# Patient Record
Sex: FEMALE | Race: WHITE | NOT HISPANIC OR LATINO | Employment: OTHER | URBAN - METROPOLITAN AREA
[De-identification: names, ages, dates, MRNs, and addresses within clinical notes are randomized per-mention and may not be internally consistent; named-entity substitution may affect disease eponyms.]

---

## 2019-07-02 ENCOUNTER — OFFICE VISIT (OUTPATIENT)
Dept: PRIMARY CARE CLINIC | Facility: CLINIC | Age: 59
End: 2019-07-02
Payer: COMMERCIAL

## 2019-07-02 ENCOUNTER — CLINICAL SUPPORT (OUTPATIENT)
Dept: PRIMARY CARE CLINIC | Facility: CLINIC | Age: 59
End: 2019-07-02
Payer: COMMERCIAL

## 2019-07-02 VITALS
BODY MASS INDEX: 24.64 KG/M2 | HEIGHT: 67 IN | SYSTOLIC BLOOD PRESSURE: 111 MMHG | OXYGEN SATURATION: 98 % | HEART RATE: 64 BPM | RESPIRATION RATE: 18 BRPM | TEMPERATURE: 98 F | WEIGHT: 157 LBS | DIASTOLIC BLOOD PRESSURE: 69 MMHG

## 2019-07-02 DIAGNOSIS — Z12.39 BREAST CANCER SCREENING: ICD-10-CM

## 2019-07-02 DIAGNOSIS — I10 ESSENTIAL HYPERTENSION, BENIGN: Primary | ICD-10-CM

## 2019-07-02 DIAGNOSIS — I10 ESSENTIAL HYPERTENSION, BENIGN: ICD-10-CM

## 2019-07-02 DIAGNOSIS — Z11.59 NEED FOR HEPATITIS C SCREENING TEST: ICD-10-CM

## 2019-07-02 DIAGNOSIS — Z13.6 ENCOUNTER FOR SCREENING FOR CARDIOVASCULAR DISORDERS: ICD-10-CM

## 2019-07-02 DIAGNOSIS — Z90.49 HISTORY OF RIGHT HEMICOLECTOMY: ICD-10-CM

## 2019-07-02 DIAGNOSIS — Z12.11 COLON CANCER SCREENING: ICD-10-CM

## 2019-07-02 DIAGNOSIS — Z12.4 CERVICAL CANCER SCREENING: ICD-10-CM

## 2019-07-02 DIAGNOSIS — Z98.84 HISTORY OF BARIATRIC SURGERY: ICD-10-CM

## 2019-07-02 LAB
25(OH)D3+25(OH)D2 SERPL-MCNC: 34 NG/ML (ref 30–96)
ALBUMIN SERPL BCP-MCNC: 4.5 G/DL (ref 3.5–5.2)
ALP SERPL-CCNC: 62 U/L (ref 38–126)
ALT SERPL W/O P-5'-P-CCNC: 15 U/L (ref 14–54)
ANION GAP SERPL CALC-SCNC: 8 MMOL/L (ref 8–16)
AST SERPL-CCNC: 21 U/L (ref 15–41)
BASOPHILS # BLD AUTO: 0 K/UL (ref 0–0.2)
BASOPHILS NFR BLD: 0.6 % (ref 0–1.9)
BILIRUB SERPL-MCNC: 1 MG/DL (ref 0.3–1.2)
BUN SERPL-MCNC: 18 MG/DL (ref 6–20)
CALCIUM SERPL-MCNC: 9.4 MG/DL (ref 8.6–10)
CHLORIDE SERPL-SCNC: 103 MMOL/L (ref 101–111)
CHOLEST SERPL-MCNC: 328 MG/DL (ref 80–200)
CHOLEST/HDLC SERPL: 7.5 {RATIO} (ref 2–5)
CO2 SERPL-SCNC: 29 MMOL/L (ref 23–29)
CREAT SERPL-MCNC: 0.8 MG/DL (ref 0.5–1.4)
DIFFERENTIAL METHOD: ABNORMAL
EOSINOPHIL # BLD AUTO: 0.1 K/UL (ref 0–0.5)
EOSINOPHIL NFR BLD: 2 % (ref 0–8)
ERYTHROCYTE [DISTWIDTH] IN BLOOD BY AUTOMATED COUNT: 13.8 % (ref 11.5–14.5)
EST. GFR  (AFRICAN AMERICAN): >60 ML/MIN/1.73 M^2
EST. GFR  (NON AFRICAN AMERICAN): >60 ML/MIN/1.73 M^2
FERRITIN SERPL-MCNC: 66 NG/ML (ref 20–300)
FOLATE SERPL-MCNC: 12.1 NG/ML
GLUCOSE SERPL-MCNC: 87 MG/DL (ref 74–118)
HCT VFR BLD AUTO: 40.4 % (ref 37–48.5)
HDLC SERPL-MCNC: 44 MG/DL (ref 40–75)
HDLC SERPL: 13.4 % (ref 20–50)
HGB BLD-MCNC: 13.3 G/DL (ref 12–16)
IRON SERPL-MCNC: 83 UG/DL (ref 30–160)
LDLC SERPL CALC-MCNC: 211 MG/DL
LYMPHOCYTES # BLD AUTO: 1.8 K/UL (ref 1–4.8)
LYMPHOCYTES NFR BLD: 33.7 % (ref 18–48)
MCH RBC QN AUTO: 29.7 PG (ref 27–31)
MCHC RBC AUTO-ENTMCNC: 33 G/DL (ref 32–36)
MCV RBC AUTO: 90 FL (ref 82–98)
MONOCYTES # BLD AUTO: 0.4 K/UL (ref 0.3–1)
MONOCYTES NFR BLD: 8.3 % (ref 4–15)
NEUTROPHILS # BLD AUTO: 2.9 K/UL (ref 1.8–7.7)
NEUTROPHILS NFR BLD: 55.4 % (ref 38–73)
NONHDLC SERPL-MCNC: 284 MG/DL
PLATELET # BLD AUTO: 223 K/UL (ref 150–350)
PMV BLD AUTO: 8.9 FL (ref 9.2–12.9)
POTASSIUM SERPL-SCNC: 3.6 MMOL/L (ref 3.5–5.1)
PROT SERPL-MCNC: 8 G/DL (ref 6–8.4)
RBC # BLD AUTO: 4.48 M/UL (ref 4–5.4)
SATURATED IRON: 21 % (ref 20–50)
SODIUM SERPL-SCNC: 140 MMOL/L (ref 136–145)
TOTAL IRON BINDING CAPACITY: 391 UG/DL (ref 250–450)
TRANSFERRIN SERPL-MCNC: 264 MG/DL (ref 200–375)
TRIGL SERPL-MCNC: 364 MG/DL (ref 30–150)
TSH SERPL DL<=0.005 MIU/L-ACNC: 1.79 UIU/ML (ref 0.45–5.33)
VIT B12 SERPL-MCNC: 212 PG/ML (ref 180–914)
WBC # BLD AUTO: 5.3 K/UL (ref 3.9–12.7)

## 2019-07-02 PROCEDURE — 36415 PR COLLECTION VENOUS BLOOD,VENIPUNCTURE: ICD-10-PCS | Mod: S$GLB,,, | Performed by: FAMILY MEDICINE

## 2019-07-02 PROCEDURE — 99203 PR OFFICE/OUTPT VISIT, NEW, LEVL III, 30-44 MIN: ICD-10-PCS | Mod: S$GLB,,, | Performed by: FAMILY MEDICINE

## 2019-07-02 PROCEDURE — 36415 COLL VENOUS BLD VENIPUNCTURE: CPT | Mod: S$GLB,,, | Performed by: FAMILY MEDICINE

## 2019-07-02 PROCEDURE — 99999 PR PBB SHADOW E&M-EST. PATIENT-LVL IV: ICD-10-PCS | Mod: PBBFAC,,, | Performed by: FAMILY MEDICINE

## 2019-07-02 PROCEDURE — 82306 VITAMIN D 25 HYDROXY: CPT

## 2019-07-02 PROCEDURE — 93010 ELECTROCARDIOGRAM REPORT: CPT | Mod: S$GLB,,, | Performed by: INTERNAL MEDICINE

## 2019-07-02 PROCEDURE — 99999 PR PBB SHADOW E&M-EST. PATIENT-LVL IV: CPT | Mod: PBBFAC,,, | Performed by: FAMILY MEDICINE

## 2019-07-02 PROCEDURE — 80053 COMPREHEN METABOLIC PANEL: CPT

## 2019-07-02 PROCEDURE — 82728 ASSAY OF FERRITIN: CPT

## 2019-07-02 PROCEDURE — 99999 PR PBB SHADOW E&M-EST. PATIENT-LVL I: ICD-10-PCS | Mod: PBBFAC,,,

## 2019-07-02 PROCEDURE — 80061 LIPID PANEL: CPT

## 2019-07-02 PROCEDURE — 82746 ASSAY OF FOLIC ACID SERUM: CPT

## 2019-07-02 PROCEDURE — 3008F PR BODY MASS INDEX (BMI) DOCUMENTED: ICD-10-PCS | Mod: CPTII,S$GLB,, | Performed by: FAMILY MEDICINE

## 2019-07-02 PROCEDURE — 93010 EKG 12-LEAD: ICD-10-PCS | Mod: S$GLB,,, | Performed by: INTERNAL MEDICINE

## 2019-07-02 PROCEDURE — 84443 ASSAY THYROID STIM HORMONE: CPT

## 2019-07-02 PROCEDURE — 3008F BODY MASS INDEX DOCD: CPT | Mod: CPTII,S$GLB,, | Performed by: FAMILY MEDICINE

## 2019-07-02 PROCEDURE — 83540 ASSAY OF IRON: CPT

## 2019-07-02 PROCEDURE — 86803 HEPATITIS C AB TEST: CPT

## 2019-07-02 PROCEDURE — 93005 ELECTROCARDIOGRAM TRACING: CPT | Mod: S$GLB,,, | Performed by: FAMILY MEDICINE

## 2019-07-02 PROCEDURE — 99203 OFFICE O/P NEW LOW 30 MIN: CPT | Mod: S$GLB,,, | Performed by: FAMILY MEDICINE

## 2019-07-02 PROCEDURE — 85025 COMPLETE CBC W/AUTO DIFF WBC: CPT

## 2019-07-02 PROCEDURE — 99999 PR PBB SHADOW E&M-EST. PATIENT-LVL I: CPT | Mod: PBBFAC,,,

## 2019-07-02 PROCEDURE — 82607 VITAMIN B-12: CPT

## 2019-07-02 PROCEDURE — 93005 EKG 12-LEAD: ICD-10-PCS | Mod: S$GLB,,, | Performed by: FAMILY MEDICINE

## 2019-07-02 RX ORDER — METOPROLOL SUCCINATE 25 MG/1
25 TABLET, EXTENDED RELEASE ORAL DAILY
Refills: 0 | COMMUNITY
Start: 2019-06-20 | End: 2019-07-02

## 2019-07-02 NOTE — PROGRESS NOTES
"Subjective:       Patient ID: Alexia Zaragoza is a 58 y.o. female.    Chief Complaint: Establish Care    Here for routine checkup, establish care. Wants to see if she really needs to stay on BP meds. Started on low-dose metoprolol ~1 year ago by NP, has never taken any other BP meds. Denies CP or SoB. Has brief palpitations maybe once or twice a month. She is s/p gastric sleeve ~6 years ago    Review of Systems   Constitutional: Positive for fatigue. Negative for chills and fever.   HENT: Negative for congestion.    Eyes: Negative for visual disturbance.   Respiratory: Negative for cough and shortness of breath.    Cardiovascular: Negative for chest pain.   Gastrointestinal: Negative for abdominal pain, blood in stool, nausea and vomiting.   Genitourinary: Negative for difficulty urinating.   Musculoskeletal: Negative for arthralgias.   Skin: Negative for rash.   Allergic/Immunologic: Negative for immunocompromised state.   Neurological: Negative for dizziness.   Hematological: Does not bruise/bleed easily.   Psychiatric/Behavioral: Negative for sleep disturbance.       Objective:      Vitals:    07/02/19 1002   BP: 111/69   BP Location: Left arm   Patient Position: Sitting   BP Method: Medium (Automatic)   Pulse: 64   Resp: 18   Temp: 98.1 °F (36.7 °C)   TempSrc: Oral   SpO2: 98%   Weight: 71.2 kg (157 lb)   Height: 5' 7" (1.702 m)     Physical Exam   Constitutional: She is oriented to person, place, and time. She appears well-developed and well-nourished.   HENT:   Head: Normocephalic and atraumatic.   Eyes: Pupils are equal, round, and reactive to light. EOM are normal.   Neck: Neck supple. No JVD present. Carotid bruit is not present.   Cardiovascular: Normal rate, regular rhythm and normal heart sounds.   Pulses:       Radial pulses are 2+ on the right side, and 2+ on the left side.   Pulmonary/Chest: Effort normal and breath sounds normal.   Abdominal: Soft. Bowel sounds are normal. She exhibits no " distension. There is no tenderness.   Musculoskeletal: She exhibits no edema.   Neurological: She is alert and oriented to person, place, and time.   Skin: Skin is warm and dry.   Psychiatric: She has a normal mood and affect. Her behavior is normal.   Nursing note and vitals reviewed.      Assessment:       1. Essential hypertension, benign    2. Breast cancer screening    3. Need for hepatitis C screening test    4. Colon cancer screening    5. Encounter for screening for cardiovascular disorders    6. Cervical cancer screening    7. History of right hemicolectomy    8. History of bariatric surgery        Plan:       Essential hypertension, benign  -     CBC auto differential; Future; Expected date: 07/02/2019  -     Comprehensive metabolic panel; Future; Expected date: 07/02/2019  -     TSH; Future; Expected date: 07/02/2019  -     EKG 12-lead  Sinus bradycardia on EKG.  Decrease metoprolol to half a tablet daily for a week, then stop.  Monitor blood pressure closely.  Return in 2 weeks for blood pressure check  Breast cancer screening  -     Mammo Digital Screening Bilat without CA; Future; Expected date: 07/02/2019    Need for hepatitis C screening test  -     Hepatitis C antibody; Future; Expected date: 07/02/2019    Colon cancer screening  -     Ambulatory referral to Colorectal Surgery    Encounter for screening for cardiovascular disorders  -     Lipid panel; Future; Expected date: 07/02/2019    Cervical cancer screening  Comments:  says she will f/u with her GYN on the [facility]    History of right hemicolectomy  -     Ambulatory referral to Colorectal Surgery    History of bariatric surgery  -     Ferritin; Future; Expected date: 07/02/2019  -     Iron and TIBC; Future; Expected date: 07/02/2019  -     Vitamin B12; Future; Expected date: 07/02/2019  -     Folate; Future; Expected date: 07/02/2019  -     Vitamin D; Future; Expected date: 07/02/2019      Medication List with Changes/Refills   Discontinued  Medications    METOPROLOL SUCCINATE (TOPROL-XL) 25 MG 24 HR TABLET    Take 25 mg by mouth once daily.

## 2019-07-03 LAB — HCV AB SERPL QL IA: NEGATIVE

## 2019-07-10 DIAGNOSIS — E78.2 MIXED HYPERLIPIDEMIA: ICD-10-CM

## 2019-07-10 RX ORDER — ROSUVASTATIN CALCIUM 10 MG/1
10 TABLET, COATED ORAL DAILY
Qty: 90 TABLET | Refills: 1 | Status: SHIPPED | OUTPATIENT
Start: 2019-07-10 | End: 2019-12-23 | Stop reason: SDUPTHER

## 2019-07-12 DIAGNOSIS — Z12.11 COLON CANCER SCREENING: ICD-10-CM

## 2019-07-16 ENCOUNTER — CLINICAL SUPPORT (OUTPATIENT)
Dept: PRIMARY CARE CLINIC | Facility: CLINIC | Age: 59
End: 2019-07-16
Payer: COMMERCIAL

## 2019-07-16 VITALS — DIASTOLIC BLOOD PRESSURE: 74 MMHG | OXYGEN SATURATION: 96 % | SYSTOLIC BLOOD PRESSURE: 110 MMHG | HEART RATE: 76 BPM

## 2019-07-16 PROCEDURE — 99999 PR PBB SHADOW E&M-EST. PATIENT-LVL II: CPT | Mod: PBBFAC,,,

## 2019-07-16 PROCEDURE — 99999 PR PBB SHADOW E&M-EST. PATIENT-LVL II: ICD-10-PCS | Mod: PBBFAC,,,

## 2019-07-16 NOTE — PROGRESS NOTES
Verified pt ID using name and . No BP medication. Obtained bp, p, and sp02. Notified physician of results. Instructed pt to repeat labs in 3 months and f/u with MD after labs. Start Crestor 10mg. Pt verbalized understanding

## 2019-07-26 ENCOUNTER — TELEPHONE (OUTPATIENT)
Dept: SURGERY | Facility: CLINIC | Age: 59
End: 2019-07-26

## 2019-07-26 NOTE — TELEPHONE ENCOUNTER
----- Message from Sandi Thornton sent at 7/26/2019  3:08 PM CDT -----  Contact: Patient  Type:  Patient Returning Call    Who Called:  Patient  Who Left Message for Patient:  Jose  Does the patient know what this is regarding?:  Appointment on 8/6  Best Call Back Number:  [phone] (home)    Additional Information:  Patient states that she will arrive at 2:30

## 2019-08-06 ENCOUNTER — OFFICE VISIT (OUTPATIENT)
Dept: SURGERY | Facility: CLINIC | Age: 59
End: 2019-08-06
Payer: COMMERCIAL

## 2019-08-06 VITALS
BODY MASS INDEX: 25.15 KG/M2 | HEART RATE: 76 BPM | RESPIRATION RATE: 16 BRPM | SYSTOLIC BLOOD PRESSURE: 140 MMHG | TEMPERATURE: 98 F | DIASTOLIC BLOOD PRESSURE: 72 MMHG | WEIGHT: 160.25 LBS | HEIGHT: 67 IN

## 2019-08-06 DIAGNOSIS — Z86.010 HISTORY OF COLON POLYPS: Primary | ICD-10-CM

## 2019-08-06 DIAGNOSIS — Z12.11 SCREEN FOR COLON CANCER: ICD-10-CM

## 2019-08-06 PROCEDURE — 99999 PR PBB SHADOW E&M-EST. PATIENT-LVL III: CPT | Mod: PBBFAC,,, | Performed by: SURGERY

## 2019-08-06 PROCEDURE — 3008F PR BODY MASS INDEX (BMI) DOCUMENTED: ICD-10-PCS | Mod: CPTII,S$GLB,, | Performed by: SURGERY

## 2019-08-06 PROCEDURE — 99203 OFFICE O/P NEW LOW 30 MIN: CPT | Mod: S$GLB,,, | Performed by: SURGERY

## 2019-08-06 PROCEDURE — 3008F BODY MASS INDEX DOCD: CPT | Mod: CPTII,S$GLB,, | Performed by: SURGERY

## 2019-08-06 PROCEDURE — 99999 PR PBB SHADOW E&M-EST. PATIENT-LVL III: ICD-10-PCS | Mod: PBBFAC,,, | Performed by: SURGERY

## 2019-08-06 PROCEDURE — 99203 PR OFFICE/OUTPT VISIT, NEW, LEVL III, 30-44 MIN: ICD-10-PCS | Mod: S$GLB,,, | Performed by: SURGERY

## 2019-08-06 NOTE — LETTER
August 6, 2019      Hugo Franco MD  [address]  [address]  [facility] [address]           [facility] General Surgery  [address]  [facility] [address]  Phone: [phone]          Patient: Alexia Zaragoza   MR Number: [id number]   YOB: 1960   Date of Visit: 8/6/2019       Dear Dr. Hugo Franco:    Thank you for referring Alexia Zaragoza to me for evaluation. Attached you will find relevant portions of my assessment and plan of care.    If you have questions, please do not hesitate to call me. I look forward to following Alexia Zaragoza along with you.    Sincerely,    Dylon Fong MD    Enclosure  CC:  No Recipients    If you would like to receive this communication electronically, please contact [email or web address] or [phone] to request more information on [employer] Link access.    For providers and/or their staff who would like to refer a patient to [facility], please contact us through our one-stop-shop provider referral line, [facility], at [phone].    If you feel you have received this communication in error or would no longer like to receive these types of communications, please e-mail [email or web address]

## 2019-08-06 NOTE — PATIENT INSTRUCTIONS
Colonoscopy is scheduled for 09/19/19 the arrival time will be given to you by the preop nurse.  The preop nurse will call you from [phone]  Fasting after midnight  An Adult to drive you home        THE PREOP NURSE WILL CALL, SOMETIMES AS LATE AS 4 PM IN THE AFTERNOON THE DAY BEFORE SURGERY.        Special Instruction:Bowel Prep is included with this letter, call Gianfranco at [phone] if you have any questions or concerns or if you need to reschedule your procedure.  The procedure will be at [facility]    Here is instruction for your colon cleanse the day before your procedure.    Medication to purchase at your pharmacy no need for Prescription:  2 bottles of Magnesium Citrate 10 or 12 ounce bottle will do.  Dulcolax tablets you will need 4 tablets in total.    On the day before the procedure you are on clear liquids all day, no solid food.   You can take your morning medications if you are allowed by your Doctor.  Clear liquid means any nonalcoholic  liquids including Jello and popcicles, juice with no pulp, soft drinks, tea, coffee no creamer, water Gatorade, chicken and/or beef broth.    You can start taking your first laxative starting as early as 8am but try not to start later than 12:00 noon.  First dose will be two Dulcolax tabs followed by 8 ounces clear liquid.  Take 2 more Dulcolax 2 hours after the first dose followed by 8 ounces of clear liquids.  Remember that the laxatives work best when you drink plenty of fluids.    Drink your first bottle of Magnesium Citrate at 5:00pm followed by 5-8ounce glasses of liquid over a 3 hour period.  At 9:00 pm take your 2nd bottle magnesium citrate followed by 3 more 8ounce glasses of clear liquid.  After Midnight nothing else to eat or drink.    Contact the office if you have questions.

## 2019-08-07 RX ORDER — SODIUM CHLORIDE, SODIUM LACTATE, POTASSIUM CHLORIDE, CALCIUM CHLORIDE 600; 310; 30; 20 MG/100ML; MG/100ML; MG/100ML; MG/100ML
INJECTION, SOLUTION INTRAVENOUS CONTINUOUS
Status: CANCELLED | OUTPATIENT
Start: 2019-08-07

## 2019-08-07 RX ORDER — SODIUM CHLORIDE 0.9 % (FLUSH) 0.9 %
10 SYRINGE (ML) INJECTION
Status: CANCELLED | OUTPATIENT
Start: 2019-08-07

## 2019-08-07 NOTE — PROGRESS NOTES
Subjective:       Patient ID: Alexia Zaragoza is a 58 y.o. female.    Chief Complaint: No chief complaint on file.    HPI  Pleasant 57 yo F referred to me in consultation from Dr Franco for evaluation of colon cancer. Pt notes that she had a R hemicolectomy about 9 years ago for a large colon polyp that was too large to be removed endoscopically.  PT notes that she has been doing well since. She was recommended to have colonoscopy at 5 years postop but has not had one.  She denies pain or discomfort.  Denies n/v.  No fever/chills. No changes in bowel habits.  She is otherwise healthy. Her PShx is significant for gastric sleeve and incsional hernia repair x3.   Review of Systems   Constitutional: Negative for activity change, appetite change, fever and unexpected weight change.   HENT: Negative for congestion and facial swelling.    Respiratory: Negative for chest tightness, shortness of breath and wheezing.    Cardiovascular: Negative for chest pain.   Gastrointestinal: Negative for abdominal distention, abdominal pain, anal bleeding, blood in stool, constipation, diarrhea, nausea, rectal pain and vomiting.   Genitourinary: Negative for difficulty urinating, dysuria and frequency.   Skin: Negative for color change and wound.   Neurological: Negative for dizziness.   Hematological: Negative for adenopathy. Does not bruise/bleed easily.   Psychiatric/Behavioral: Negative for agitation and decreased concentration.       Objective:      Physical Exam   Constitutional: She is oriented to person, place, and time. She appears well-developed and well-nourished.   HENT:   Head: Normocephalic and atraumatic.   Eyes: Pupils are equal, round, and reactive to light.   Neck: Normal range of motion. Neck supple. No tracheal deviation present. No thyromegaly present.   Cardiovascular: Normal rate, regular rhythm and normal heart sounds.   No murmur heard.  Pulmonary/Chest: Effort normal and breath sounds normal. She exhibits no  tenderness.   Abdominal: Soft. Bowel sounds are normal. She exhibits no distension, no abdominal bruit, no pulsatile midline mass and no mass. There is no hepatosplenomegaly. There is no tenderness. There is no rigidity, no rebound, no guarding, no tenderness at McBurney's point and negative Yeboah's sign. No hernia. Hernia confirmed negative in the ventral area.   Genitourinary: Rectum normal.   Musculoskeletal: Normal range of motion.   Neurological: She is alert and oriented to person, place, and time.   Skin: Skin is warm. No rash noted. No erythema.   Psychiatric: She has a normal mood and affect.   Vitals reviewed.      Assessment:     History of colon polyp  No diagnosis found.    Plan:       D?w pt. I have recommended cscope at a time that is convenient to pt.  Rissk and benefits were d/w pt.  Informed consent.  Proceed with colonoscopy at a time to be scheduled

## 2019-09-04 ENCOUNTER — TELEPHONE (OUTPATIENT)
Dept: SURGERY | Facility: CLINIC | Age: 59
End: 2019-09-04

## 2019-09-04 ENCOUNTER — TELEPHONE (OUTPATIENT)
Dept: CARDIOLOGY | Facility: CLINIC | Age: 59
End: 2019-09-04

## 2019-09-04 NOTE — TELEPHONE ENCOUNTER
Spoke with patient about her coming colon screening. Stated she does not have the ability to take off from work for 9/19/19 and would like to reschedule for some time in the middle of December.

## 2019-09-04 NOTE — TELEPHONE ENCOUNTER
----- Message from Mark Mendes sent at 9/4/2019  1:05 PM CDT -----  Contact: Patient  Type: Needs Medical Advice    Who Called:  Patient  Best Call Back Number: [phone]  Additional Information: Patient would like to reschedule upcoming colonoscopy to December. Please call to advise. Thanks!

## 2019-09-05 DIAGNOSIS — Z12.11 SCREENING FOR COLON CANCER: Primary | ICD-10-CM

## 2019-09-05 RX ORDER — SODIUM CHLORIDE 0.9 % (FLUSH) 0.9 %
3 SYRINGE (ML) INJECTION
Status: CANCELLED | OUTPATIENT
Start: 2019-09-05

## 2019-09-05 NOTE — TELEPHONE ENCOUNTER
----- Message from Rita Vasquez sent at 9/5/2019  3:17 PM CDT -----  Contact: Patient  Type: Needs Medical Advice    Who Called:  patient  Best Call Back Number: [phone] (home)   Additional Information: patient would like to cancel the appt for 9/19 with Dylon Fong and reschedule the colonoscopy and asking for a call back said she has left 3 messages an no one has called her back.

## 2019-09-05 NOTE — TELEPHONE ENCOUNTER
Returned patient's call. Patient requested the Colonoscopy scheduled for 09/19/2019 be canceled and rescheduled for 12/12/2019. Patient acknowledges receiving and understanding the previously sent Colonoscopy Prep instructions. Patient did not have any questions about the Colonoscopy procedure No further issues were discussed.. Anne in surgery notified of patient's request to cancel and reschedule Colonoscopy by staff messaging.

## 2019-12-02 ENCOUNTER — PATIENT OUTREACH (OUTPATIENT)
Dept: ADMINISTRATIVE | Facility: HOSPITAL | Age: 59
End: 2019-12-02

## 2019-12-03 ENCOUNTER — TELEPHONE (OUTPATIENT)
Dept: PRIMARY CARE CLINIC | Facility: CLINIC | Age: 59
End: 2019-12-03

## 2019-12-03 NOTE — PROGRESS NOTES
Immunizations reviewed. Legacy reviewed. Care Everywhere reviewed. Attempted to contact patient to discuss overdue HM. LMOM for return call. Portal message sent to patient with appointment task attached. Pre-visit chart review completed.

## 2019-12-03 NOTE — TELEPHONE ENCOUNTER
----- Message from Obdulia Powers sent at 12/3/2019 10:00 AM CST -----  Contact: pATIENT  Returned Call    Who left the message: Jordan Brar LPN    When did the practice call: 12/02/2019 7:17PM    Please advise.    Thank You

## 2019-12-05 ENCOUNTER — PATIENT OUTREACH (OUTPATIENT)
Dept: ADMINISTRATIVE | Facility: HOSPITAL | Age: 59
End: 2019-12-05

## 2019-12-06 ENCOUNTER — TELEPHONE (OUTPATIENT)
Dept: SURGERY | Facility: CLINIC | Age: 59
End: 2019-12-06

## 2019-12-06 NOTE — TELEPHONE ENCOUNTER
Placed a follow up call to patient related to upcoming Colonoscopy procedure.  . Colonoscopy Prep instructions were summarized with the patient. Patient acknowledges understanding Prep instructions. No further issues were discussed.

## 2019-12-16 ENCOUNTER — CLINICAL SUPPORT (OUTPATIENT)
Dept: PRIMARY CARE CLINIC | Facility: CLINIC | Age: 59
End: 2019-12-16
Payer: COMMERCIAL

## 2019-12-16 ENCOUNTER — TELEPHONE (OUTPATIENT)
Dept: SURGERY | Facility: CLINIC | Age: 59
End: 2019-12-16

## 2019-12-16 DIAGNOSIS — E78.2 MIXED HYPERLIPIDEMIA: ICD-10-CM

## 2019-12-16 LAB
ALBUMIN SERPL BCP-MCNC: 4.3 G/DL (ref 3.5–5.2)
ALP SERPL-CCNC: 60 U/L (ref 38–126)
ALT SERPL W/O P-5'-P-CCNC: 21 U/L (ref 14–54)
ANION GAP SERPL CALC-SCNC: 8 MMOL/L (ref 8–16)
AST SERPL-CCNC: 33 U/L (ref 15–41)
BILIRUB SERPL-MCNC: 1 MG/DL (ref 0.3–1.2)
BUN SERPL-MCNC: 20 MG/DL (ref 6–20)
CALCIUM SERPL-MCNC: 9.5 MG/DL (ref 8.6–10)
CHLORIDE SERPL-SCNC: 106 MMOL/L (ref 101–111)
CHOLEST SERPL-MCNC: 301 MG/DL (ref 80–200)
CHOLEST/HDLC SERPL: 7.3 {RATIO} (ref 2–5)
CO2 SERPL-SCNC: 26 MMOL/L (ref 23–29)
CREAT SERPL-MCNC: 0.8 MG/DL (ref 0.5–1.4)
EST. GFR  (AFRICAN AMERICAN): >60 ML/MIN/1.73 M^2
EST. GFR  (NON AFRICAN AMERICAN): >60 ML/MIN/1.73 M^2
GLUCOSE SERPL-MCNC: 85 MG/DL (ref 74–118)
HDLC SERPL-MCNC: 41 MG/DL (ref 40–75)
HDLC SERPL: 13.6 % (ref 20–50)
LDLC SERPL CALC-MCNC: 213 MG/DL
NONHDLC SERPL-MCNC: 260 MG/DL
POTASSIUM SERPL-SCNC: 4 MMOL/L (ref 3.5–5.1)
PROT SERPL-MCNC: 7.3 G/DL (ref 6–8.4)
SODIUM SERPL-SCNC: 140 MMOL/L (ref 136–145)
TRIGL SERPL-MCNC: 235 MG/DL (ref 30–150)

## 2019-12-16 PROCEDURE — 80061 LIPID PANEL: CPT

## 2019-12-16 PROCEDURE — 36415 PR COLLECTION VENOUS BLOOD,VENIPUNCTURE: ICD-10-PCS | Mod: S$GLB,,, | Performed by: FAMILY MEDICINE

## 2019-12-16 PROCEDURE — 36415 COLL VENOUS BLD VENIPUNCTURE: CPT | Mod: S$GLB,,, | Performed by: FAMILY MEDICINE

## 2019-12-16 PROCEDURE — 80053 COMPREHEN METABOLIC PANEL: CPT

## 2019-12-16 NOTE — TELEPHONE ENCOUNTER
----- Message from Zuleyma Chase sent at 12/16/2019  2:03 PM CST -----  Contact: self  [phone]  She asks that you email the instructions for the colonoscopy.  Please call her.  Thank you!

## 2019-12-16 NOTE — TELEPHONE ENCOUNTER
Spoke with patient about her needing the prep instructions for her colon screening. Asked if the patient could pass by the clinic tomorrow to obtain a copy of the instructions. She stated she could come to the clinic after 3p. No further issues discussed.

## 2019-12-17 ENCOUNTER — TELEPHONE (OUTPATIENT)
Dept: SURGERY | Facility: CLINIC | Age: 59
End: 2019-12-17

## 2019-12-17 NOTE — TELEPHONE ENCOUNTER
Called patient at all available numbers to review Colonoscopy Prep instructions for upcoming scheduled Colonoscopy and to give the patient an opportunity to ask any questions about upcoming Colonoscopy, left message.

## 2019-12-23 ENCOUNTER — OFFICE VISIT (OUTPATIENT)
Dept: PRIMARY CARE CLINIC | Facility: CLINIC | Age: 59
End: 2019-12-23
Payer: COMMERCIAL

## 2019-12-23 VITALS
HEIGHT: 67 IN | TEMPERATURE: 98 F | BODY MASS INDEX: 24.64 KG/M2 | RESPIRATION RATE: 18 BRPM | SYSTOLIC BLOOD PRESSURE: 132 MMHG | DIASTOLIC BLOOD PRESSURE: 88 MMHG | OXYGEN SATURATION: 96 % | WEIGHT: 157 LBS | HEART RATE: 63 BPM

## 2019-12-23 DIAGNOSIS — Z91.148 NONCOMPLIANCE W/MEDICATION TREATMENT DUE TO INTERMIT USE OF MEDICATION: ICD-10-CM

## 2019-12-23 DIAGNOSIS — E78.2 MIXED HYPERLIPIDEMIA: Primary | ICD-10-CM

## 2019-12-23 DIAGNOSIS — M54.41 ACUTE RIGHT-SIDED LOW BACK PAIN WITH RIGHT-SIDED SCIATICA: ICD-10-CM

## 2019-12-23 DIAGNOSIS — J06.9 UPPER RESPIRATORY TRACT INFECTION, UNSPECIFIED TYPE: ICD-10-CM

## 2019-12-23 PROCEDURE — 99214 PR OFFICE/OUTPT VISIT, EST, LEVL IV, 30-39 MIN: ICD-10-PCS | Mod: S$GLB,,, | Performed by: FAMILY MEDICINE

## 2019-12-23 PROCEDURE — 99999 PR PBB SHADOW E&M-EST. PATIENT-LVL III: ICD-10-PCS | Mod: PBBFAC,,, | Performed by: FAMILY MEDICINE

## 2019-12-23 PROCEDURE — 3008F PR BODY MASS INDEX (BMI) DOCUMENTED: ICD-10-PCS | Mod: CPTII,S$GLB,, | Performed by: FAMILY MEDICINE

## 2019-12-23 PROCEDURE — 99214 OFFICE O/P EST MOD 30 MIN: CPT | Mod: S$GLB,,, | Performed by: FAMILY MEDICINE

## 2019-12-23 PROCEDURE — 3008F BODY MASS INDEX DOCD: CPT | Mod: CPTII,S$GLB,, | Performed by: FAMILY MEDICINE

## 2019-12-23 PROCEDURE — 99999 PR PBB SHADOW E&M-EST. PATIENT-LVL III: CPT | Mod: PBBFAC,,, | Performed by: FAMILY MEDICINE

## 2019-12-23 RX ORDER — IBUPROFEN 800 MG/1
800 TABLET ORAL 3 TIMES DAILY PRN
Qty: 30 TABLET | Refills: 2 | Status: SHIPPED | OUTPATIENT
Start: 2019-12-23 | End: 2020-06-01 | Stop reason: HOSPADM

## 2019-12-23 RX ORDER — ROSUVASTATIN CALCIUM 10 MG/1
10 TABLET, COATED ORAL DAILY
Qty: 90 TABLET | Refills: 1 | Status: SHIPPED | OUTPATIENT
Start: 2019-12-23 | End: 2020-09-01

## 2019-12-23 RX ORDER — METHYLPREDNISOLONE 4 MG/1
TABLET ORAL
Qty: 1 PACKAGE | Refills: 0 | Status: SHIPPED | OUTPATIENT
Start: 2019-12-23 | End: 2020-05-06 | Stop reason: CLARIF

## 2019-12-23 NOTE — PROGRESS NOTES
"Subjective:       Patient ID: Alexia Zaragoza is a 59 y.o. female.    Chief Complaint: Cough (x1 week ); Hyperlipidemia (here to follow up on lab results ); and Back Pain (says she was wrapping presents and hurt her back )    While bent over wrapping presents last night, stood up quickly and 'threw out' her back, having pain in right lower back, radiates into right buttock and posterior thigh. Trouble sleeping due to pain last night, can't stand up straight  Has had nonproductive cough and congestion x 1 week, worse at night and early AM. No fever or chills, no wheezing or SoB  HLD - cholesterol still very elevated despite taking Crestor for the past few months, though says she has missed doses frequently    Review of Systems   Constitutional: Negative for fever.   HENT: Positive for congestion.    Respiratory: Positive for cough. Negative for shortness of breath.    Cardiovascular: Negative for chest pain.   Gastrointestinal: Negative for vomiting.   Musculoskeletal: Positive for back pain.   Skin: Negative for rash.   Allergic/Immunologic: Negative for immunocompromised state.   Neurological: Negative for weakness.   Hematological: Does not bruise/bleed easily.   Psychiatric/Behavioral: Negative for agitation.       Objective:      Vitals:    12/23/19 1623   BP: 132/88   BP Location: Left arm   Patient Position: Sitting   BP Method: Medium (Manual)   Pulse: 63   Resp: 18   Temp: 98.1 °F (36.7 °C)   TempSrc: Oral   SpO2: 96%   Weight: 71.2 kg (157 lb)   Height: 5' 7" (1.702 m)     Physical Exam   Constitutional: She is oriented to person, place, and time. She appears well-developed and well-nourished.   HENT:   Head: Normocephalic and atraumatic.   Right Ear: Tympanic membrane normal.   Left Ear: Tympanic membrane normal.   Mouth/Throat: Oropharynx is clear and moist.   Cardiovascular: Normal rate, regular rhythm and normal heart sounds.   Pulmonary/Chest: Effort normal and breath sounds normal.   Musculoskeletal: " She exhibits no edema.        Lumbar back: She exhibits decreased range of motion, tenderness and spasm.        Back:    Positive straight leg raise on right   Neurological: She is alert and oriented to person, place, and time. She has normal strength.   Reflex Scores:       Patellar reflexes are 2+ on the right side and 2+ on the left side.  Skin: Skin is warm and dry.   Psychiatric: She has a normal mood and affect. Her behavior is normal.   Nursing note and vitals reviewed.      Lab Results   Component Value Date    WBC 5.30 07/02/2019    HGB 13.3 07/02/2019    HCT 40.4 07/02/2019     07/02/2019    CHOL 301 (H) 12/16/2019    TRIG 235 (H) 12/16/2019    HDL 41 12/16/2019    ALT 21 12/16/2019    AST 33 12/16/2019     12/16/2019    K 4.0 12/16/2019     12/16/2019    CREATININE 0.8 12/16/2019    BUN 20 12/16/2019    CO2 26 12/16/2019    TSH 1.79 07/02/2019      Assessment:       1. Mixed hyperlipidemia    2. Upper respiratory tract infection, unspecified type    3. Acute right-sided low back pain with right-sided sciatica    4. Noncompliance w/medication treatment due to intermit use of medication        Plan:       Mixed hyperlipidemia  -     rosuvastatin (CRESTOR) 10 MG tablet; Take 1 tablet (10 mg total) by mouth once daily.  Dispense: 90 tablet; Refill: 1  -     Comprehensive metabolic panel; Future; Expected date: 03/23/2020  -     Lipid panel; Future; Expected date: 03/23/2020    Upper respiratory tract infection, unspecified type  Likely viral, treat symptomatically  Acute right-sided low back pain with right-sided sciatica  -     methylPREDNISolone (MEDROL DOSEPACK) 4 mg tablet; use as directed  Dispense: 1 Package; Refill: 0  -     ibuprofen (ADVIL,MOTRIN) 800 MG tablet; Take 1 tablet (800 mg total) by mouth 3 (three) times daily as needed for Pain.  Dispense: 30 tablet; Refill: 2    Noncompliance w/medication treatment due to intermit use of medication  Filled 90 day supply Crestor 7/10,  didn't refill until 12/19. Stressed importance of compliance with meds    Medication List with Changes/Refills   New Medications    IBUPROFEN (ADVIL,MOTRIN) 800 MG TABLET    Take 1 tablet (800 mg total) by mouth 3 (three) times daily as needed for Pain.    METHYLPREDNISOLONE (MEDROL DOSEPACK) 4 MG TABLET    use as directed   Changed and/or Refilled Medications    Modified Medication Previous Medication    ROSUVASTATIN (CRESTOR) 10 MG TABLET rosuvastatin (CRESTOR) 10 MG tablet       Take 1 tablet (10 mg total) by mouth once daily.    Take 1 tablet (10 mg total) by mouth once daily.

## 2020-01-17 ENCOUNTER — TELEPHONE (OUTPATIENT)
Dept: SURGERY | Facility: CLINIC | Age: 60
End: 2020-01-17

## 2020-01-17 NOTE — TELEPHONE ENCOUNTER
Called and attempted to reviewed pathology with pt.      Colon, transverse polyp (biopsy):   Tubular adenoma   Multiple deeper levels examined      Pt did not answer  Recommend repeat cscope in 5 years    WIll have office reach out to pt

## 2020-03-08 ENCOUNTER — PATIENT OUTREACH (OUTPATIENT)
Dept: ADMINISTRATIVE | Facility: HOSPITAL | Age: 60
End: 2020-03-08

## 2020-03-08 NOTE — PROGRESS NOTES
Immunizations reviewed. Legacy reviewed. Care Everywhere reviewed. [facility] reviewed. Pre-visit chart review completed.

## 2020-03-10 LAB
ALBUMIN SERPL-MCNC: 4.6 G/DL (ref 3.6–5.1)
ALBUMIN/GLOB SERPL: 1.9 (CALC) (ref 1–2.5)
ALP SERPL-CCNC: 60 U/L (ref 37–153)
ALT SERPL-CCNC: 19 U/L (ref 6–29)
AST SERPL-CCNC: 23 U/L (ref 10–35)
BILIRUB SERPL-MCNC: 0.7 MG/DL (ref 0.2–1.2)
BUN SERPL-MCNC: 17 MG/DL (ref 7–25)
BUN/CREAT SERPL: NORMAL (CALC) (ref 6–22)
CALCIUM SERPL-MCNC: 9.6 MG/DL (ref 8.6–10.4)
CHLORIDE SERPL-SCNC: 104 MMOL/L (ref 98–110)
CHOLEST SERPL-MCNC: 199 MG/DL
CHOLEST/HDLC SERPL: 3.9 (CALC)
CO2 SERPL-SCNC: 30 MMOL/L (ref 20–32)
CREAT SERPL-MCNC: 0.77 MG/DL (ref 0.5–1.05)
GFRSERPLBLD MDRD-ARVRAT: 86 ML/MIN/1.73M2
GLOBULIN SER CALC-MCNC: 2.4 G/DL (CALC) (ref 1.9–3.7)
GLUCOSE SERPL-MCNC: 98 MG/DL (ref 65–99)
HDLC SERPL-MCNC: 51 MG/DL
LDLC SERPL CALC-MCNC: 120 MG/DL (CALC)
NONHDLC SERPL-MCNC: 148 MG/DL (CALC)
POTASSIUM SERPL-SCNC: 4.1 MMOL/L (ref 3.5–5.3)
PROT SERPL-MCNC: 7 G/DL (ref 6.1–8.1)
SODIUM SERPL-SCNC: 142 MMOL/L (ref 135–146)
TRIGL SERPL-MCNC: 164 MG/DL

## 2020-04-16 ENCOUNTER — TELEPHONE (OUTPATIENT)
Dept: PRIMARY CARE CLINIC | Facility: CLINIC | Age: 60
End: 2020-04-16

## 2020-04-16 NOTE — TELEPHONE ENCOUNTER
Called patient in regards to her recently canceled appointment.  She says she does not to rescheduled at this time but asked for her lab results. Results note from lizbteh given to patient and patient verbalized understanding

## 2020-05-07 ENCOUNTER — HOSPITAL ENCOUNTER (OUTPATIENT)
Facility: HOSPITAL | Age: 60
Discharge: HOME | End: 2020-05-10
Attending: FAMILY MEDICINE | Admitting: FAMILY MEDICINE
Payer: COMMERCIAL

## 2020-05-07 ENCOUNTER — NURSE TRIAGE (OUTPATIENT)
Dept: ADMINISTRATIVE | Facility: CLINIC | Age: 60
End: 2020-05-07

## 2020-05-07 ENCOUNTER — TELEPHONE (OUTPATIENT)
Dept: PRIMARY CARE CLINIC | Facility: CLINIC | Age: 60
End: 2020-05-07

## 2020-05-07 DIAGNOSIS — K80.50 CHOLEDOCHOLITHIASIS: ICD-10-CM

## 2020-05-07 DIAGNOSIS — R79.89 ELEVATED LFTS: ICD-10-CM

## 2020-05-07 DIAGNOSIS — K80.42 CHOLEDOCHOLITHIASIS WITH ACUTE CHOLECYSTITIS: Primary | ICD-10-CM

## 2020-05-07 DIAGNOSIS — E78.2 MIXED HYPERLIPIDEMIA: ICD-10-CM

## 2020-05-07 PROBLEM — Z90.49 HISTORY OF RIGHT HEMICOLECTOMY: Status: RESOLVED | Noted: 2019-07-02 | Resolved: 2020-05-07

## 2020-05-07 LAB
ESTIMATED AVG GLUCOSE: 120 MG/DL (ref 68–131)
HBA1C MFR BLD HPLC: 5.8 % (ref 4–5.6)
MAGNESIUM SERPL-MCNC: 2.2 MG/DL (ref 1.6–2.6)
PHOSPHATE SERPL-MCNC: 3.3 MG/DL (ref 2.7–4.5)
TSH SERPL DL<=0.005 MIU/L-ACNC: 0.76 UIU/ML (ref 0.4–4)

## 2020-05-07 PROCEDURE — G0379 DIRECT REFER HOSPITAL OBSERV: HCPCS

## 2020-05-07 PROCEDURE — 96374 THER/PROPH/DIAG INJ IV PUSH: CPT

## 2020-05-07 PROCEDURE — 83735 ASSAY OF MAGNESIUM: CPT

## 2020-05-07 PROCEDURE — 84443 ASSAY THYROID STIM HORMONE: CPT

## 2020-05-07 PROCEDURE — 36415 COLL VENOUS BLD VENIPUNCTURE: CPT

## 2020-05-07 PROCEDURE — 63600175 PHARM REV CODE 636 W HCPCS: Performed by: STUDENT IN AN ORGANIZED HEALTH CARE EDUCATION/TRAINING PROGRAM

## 2020-05-07 PROCEDURE — 83036 HEMOGLOBIN GLYCOSYLATED A1C: CPT

## 2020-05-07 PROCEDURE — G0378 HOSPITAL OBSERVATION PER HR: HCPCS

## 2020-05-07 PROCEDURE — 84100 ASSAY OF PHOSPHORUS: CPT

## 2020-05-07 RX ORDER — IBUPROFEN 200 MG
16 TABLET ORAL
Status: DISCONTINUED | OUTPATIENT
Start: 2020-05-07 | End: 2020-05-10 | Stop reason: HOSPADM

## 2020-05-07 RX ORDER — PANTOPRAZOLE SODIUM 40 MG/1
40 TABLET, DELAYED RELEASE ORAL DAILY
Status: DISCONTINUED | OUTPATIENT
Start: 2020-05-09 | End: 2020-05-10 | Stop reason: HOSPADM

## 2020-05-07 RX ORDER — GLUCAGON 1 MG
1 KIT INJECTION
Status: DISCONTINUED | OUTPATIENT
Start: 2020-05-07 | End: 2020-05-10 | Stop reason: HOSPADM

## 2020-05-07 RX ORDER — KETOROLAC TROMETHAMINE 30 MG/ML
15 INJECTION, SOLUTION INTRAMUSCULAR; INTRAVENOUS EVERY 6 HOURS PRN
Status: DISCONTINUED | OUTPATIENT
Start: 2020-05-07 | End: 2020-05-09

## 2020-05-07 RX ORDER — TALC
9 POWDER (GRAM) TOPICAL NIGHTLY PRN
Status: DISCONTINUED | OUTPATIENT
Start: 2020-05-07 | End: 2020-05-10 | Stop reason: HOSPADM

## 2020-05-07 RX ORDER — ACETAMINOPHEN 325 MG/1
650 TABLET ORAL EVERY 4 HOURS PRN
Status: DISCONTINUED | OUTPATIENT
Start: 2020-05-07 | End: 2020-05-09

## 2020-05-07 RX ORDER — SODIUM CHLORIDE 0.9 % (FLUSH) 0.9 %
5 SYRINGE (ML) INJECTION
Status: DISCONTINUED | OUTPATIENT
Start: 2020-05-07 | End: 2020-05-10 | Stop reason: HOSPADM

## 2020-05-07 RX ORDER — IBUPROFEN 200 MG
24 TABLET ORAL
Status: DISCONTINUED | OUTPATIENT
Start: 2020-05-07 | End: 2020-05-10 | Stop reason: HOSPADM

## 2020-05-07 RX ORDER — ONDANSETRON 8 MG/1
8 TABLET, ORALLY DISINTEGRATING ORAL EVERY 6 HOURS PRN
Status: DISCONTINUED | OUTPATIENT
Start: 2020-05-07 | End: 2020-05-10 | Stop reason: HOSPADM

## 2020-05-07 RX ADMIN — KETOROLAC TROMETHAMINE 15 MG: 30 INJECTION, SOLUTION INTRAMUSCULAR at 10:05

## 2020-05-07 NOTE — TELEPHONE ENCOUNTER
Kacey instructed patient to go back to the ED. Tried calling patient to see if she wants to call her daughter with an update since I do not have the rights to speak to her daughter but she did not answer.

## 2020-05-07 NOTE — TELEPHONE ENCOUNTER
----- Message from Tommie Saab sent at 5/7/2020 10:27 AM CDT -----  Contact: Patient [phone]  Patient would like to get medical advice.  Symptoms (please be specific): Chest/Back pain      Comments: Patient stating was told to follow up with provider regarding chest and back pain yesterday, today, is having really bad chest and back pain is requesting call back asap.    Also got over to nurse on call    Please call an advise  Thank you

## 2020-05-07 NOTE — TELEPHONE ENCOUNTER
Spoke with pt:went to ED last night. Has not filled meds prescribed to pt. Was told to follow up with PCP today.       + nausea and vomiting. + CP > greater than 5 minutes located to back and comes through to chest and ribs. S/s did start after drinking coffee. Pain rated as severe.           Reason for Disposition   Chest pain lasting longer than 5 minutes and ANY of the following:* Over 50 years old* Over 30 years old and at least one cardiac risk factor (i.e., high blood pressure, diabetes, high cholesterol, obesity, smoker or strong family history of heart disease)* Pain is crushing, pressure-like, or heavy * Took nitroglycerin and chest pain was not relieved* History of heart disease (i.e., angina, heart attack, bypass surgery, angioplasty, CHF)    Additional Information   Negative: Severe difficulty breathing (e.g., struggling for each breath, speaks in single words)   Negative: Passed out (i.e., fainted, collapsed and was not responding)   Negative: Visible sweat on face or sweat dripping down face    Protocols used: CHEST PAIN-A-OH

## 2020-05-07 NOTE — PROVIDER TRANSFER
" (Physician in Lead of Transfers)/Regional Referral Center Note    Patients name/MRN: Alexia Zaragoza/MRN [id number]    Referring Physician or Mid-Level provider giving report: Dr. Duke Puga (Emergency Medicine)  Contact number: [phone]    Referral Facility: [facility] in [place], [place]    Date/Time of Acceptance: 5/7/2020 6:15 PM    : Dr. Jami Fierro (Hospital Medicine); Case discussed with Dr. Kwaku Bauer who is accepting to \[facility]\"" Family Medicine at [facility]     Accepting facility [facility]    Consulting Physicians from [facility] involved in case:  Dr. Elian Urrutia ([facility])    Reason for transfer request: Needs ERCP for symptomatic cholelithiasis an dilated CBD    Report from Physician/Mid-Level Provider/HPI: 58 y/o female with essential HTN, HLP and cluster headaches presented to [place] ED this afternoon with recurrent lower chest pain and burning with nausea. Patient was seen early this am in ED with similar complaints with normal EKG and troponin and mildly elevated D-Dimer of 1.15 and CTA of chest done and negative for PE and labs unremarkable so discharged after relief from GI cocktail and suspected GERD. Pain returned this afternoon and more severe so returned to ED for evaluation. Labs now show elevated , , and T. maryam 1.6 but normal ALP 80. Lipase 39. RUQ ultrasound done showed sludge in the dependent portion of the gallbladder and common bile duct measures 1 cm in greatest diameter. WBC normal at 9800. Repeat troponin this afternoon negative. No GI available at [facility] so request made for transfer as patient with symptomatic cholelithiasis. Case discussed with Dr. Elian Urrutia who stated patient can be admitted to [facility] to get ERCP done. COVID 19 screen in ED was negative.     VS: /77   Pulse 93   Temp 98.4 °F (36.9 °C) (Oral)   Resp 24   Ht 5' 7" (1.702 m)   Wt 72.6 kg " (160 lb)   SpO2 97% on room air  BMI 25.06 kg/m²    Lines/Tubes:  Peripheral line ? Type: 20 gauge Location: Right Forearm    Past Medical/Surgical History: See EPIC    Meds: See EPIC    Labs: See EPIC    Imaging: See EPIC    To Do List upon arrival:     Consult Advanced Endoscopy/GI to do ERCP   NPO after midnight. No antibiotics needed at this time.     Jami Fierro MD  Senior Staff Physician  Department of Hospital Medicine  [facility]/   [phone]

## 2020-05-08 ENCOUNTER — ANESTHESIA EVENT (OUTPATIENT)
Dept: ENDOSCOPY | Facility: HOSPITAL | Age: 60
End: 2020-05-08
Payer: COMMERCIAL

## 2020-05-08 ENCOUNTER — ANESTHESIA (OUTPATIENT)
Dept: ENDOSCOPY | Facility: HOSPITAL | Age: 60
End: 2020-05-08
Payer: COMMERCIAL

## 2020-05-08 ENCOUNTER — TELEPHONE (OUTPATIENT)
Dept: PRIMARY CARE CLINIC | Facility: CLINIC | Age: 60
End: 2020-05-08

## 2020-05-08 ENCOUNTER — ANESTHESIA EVENT (OUTPATIENT)
Dept: SURGERY | Facility: HOSPITAL | Age: 60
End: 2020-05-08
Payer: COMMERCIAL

## 2020-05-08 LAB
ALBUMIN SERPL BCP-MCNC: 3.5 G/DL (ref 3.5–5.2)
ALP SERPL-CCNC: 123 U/L (ref 55–135)
ALT SERPL W/O P-5'-P-CCNC: 278 U/L (ref 10–44)
ANION GAP SERPL CALC-SCNC: 8 MMOL/L (ref 8–16)
AST SERPL-CCNC: 291 U/L (ref 10–40)
BASOPHILS # BLD AUTO: 0.02 K/UL (ref 0–0.2)
BASOPHILS NFR BLD: 0.3 % (ref 0–1.9)
BILIRUB DIRECT SERPL-MCNC: 1.5 MG/DL (ref 0.1–0.3)
BILIRUB SERPL-MCNC: 2.4 MG/DL (ref 0.1–1)
BUN SERPL-MCNC: 13 MG/DL (ref 6–20)
CALCIUM SERPL-MCNC: 9.2 MG/DL (ref 8.7–10.5)
CHLORIDE SERPL-SCNC: 104 MMOL/L (ref 95–110)
CO2 SERPL-SCNC: 28 MMOL/L (ref 23–29)
CREAT SERPL-MCNC: 0.8 MG/DL (ref 0.5–1.4)
DIFFERENTIAL METHOD: ABNORMAL
EOSINOPHIL # BLD AUTO: 0.1 K/UL (ref 0–0.5)
EOSINOPHIL NFR BLD: 1.6 % (ref 0–8)
ERYTHROCYTE [DISTWIDTH] IN BLOOD BY AUTOMATED COUNT: 12.4 % (ref 11.5–14.5)
EST. GFR  (AFRICAN AMERICAN): >60 ML/MIN/1.73 M^2
EST. GFR  (NON AFRICAN AMERICAN): >60 ML/MIN/1.73 M^2
GLUCOSE SERPL-MCNC: 98 MG/DL (ref 70–110)
HCT VFR BLD AUTO: 37.1 % (ref 37–48.5)
HGB BLD-MCNC: 12 G/DL (ref 12–16)
IMM GRANULOCYTES # BLD AUTO: 0.02 K/UL (ref 0–0.04)
IMM GRANULOCYTES NFR BLD AUTO: 0.3 % (ref 0–0.5)
LYMPHOCYTES # BLD AUTO: 0.9 K/UL (ref 1–4.8)
LYMPHOCYTES NFR BLD: 13.1 % (ref 18–48)
MAGNESIUM SERPL-MCNC: 2.3 MG/DL (ref 1.6–2.6)
MCH RBC QN AUTO: 29.9 PG (ref 27–31)
MCHC RBC AUTO-ENTMCNC: 32.3 G/DL (ref 32–36)
MCV RBC AUTO: 93 FL (ref 82–98)
MONOCYTES # BLD AUTO: 0.9 K/UL (ref 0.3–1)
MONOCYTES NFR BLD: 13.9 % (ref 4–15)
NEUTROPHILS # BLD AUTO: 4.8 K/UL (ref 1.8–7.7)
NEUTROPHILS NFR BLD: 70.8 % (ref 38–73)
NRBC BLD-RTO: 0 /100 WBC
PHOSPHATE SERPL-MCNC: 3.7 MG/DL (ref 2.7–4.5)
PLATELET # BLD AUTO: 205 K/UL (ref 150–350)
PMV BLD AUTO: 9.4 FL (ref 9.2–12.9)
POTASSIUM SERPL-SCNC: 3.6 MMOL/L (ref 3.5–5.1)
PROT SERPL-MCNC: 6.8 G/DL (ref 6–8.4)
RBC # BLD AUTO: 4.01 M/UL (ref 4–5.4)
SODIUM SERPL-SCNC: 140 MMOL/L (ref 136–145)
WBC # BLD AUTO: 6.77 K/UL (ref 3.9–12.7)

## 2020-05-08 PROCEDURE — 99214 OFFICE O/P EST MOD 30 MIN: CPT | Mod: 57,,, | Performed by: SURGERY

## 2020-05-08 PROCEDURE — 43259 EGD US EXAM DUODENUM/JEJUNUM: CPT | Performed by: INTERNAL MEDICINE

## 2020-05-08 PROCEDURE — 36415 COLL VENOUS BLD VENIPUNCTURE: CPT

## 2020-05-08 PROCEDURE — 83735 ASSAY OF MAGNESIUM: CPT

## 2020-05-08 PROCEDURE — 99204 PR OFFICE/OUTPT VISIT, NEW, LEVL IV, 45-59 MIN: ICD-10-PCS | Mod: 25,,, | Performed by: INTERNAL MEDICINE

## 2020-05-08 PROCEDURE — 63600175 PHARM REV CODE 636 W HCPCS: Performed by: NURSE ANESTHETIST, CERTIFIED REGISTERED

## 2020-05-08 PROCEDURE — G0378 HOSPITAL OBSERVATION PER HR: HCPCS

## 2020-05-08 PROCEDURE — 37000008 HC ANESTHESIA 1ST 15 MINUTES: Performed by: INTERNAL MEDICINE

## 2020-05-08 PROCEDURE — 80053 COMPREHEN METABOLIC PANEL: CPT

## 2020-05-08 PROCEDURE — 99204 OFFICE O/P NEW MOD 45 MIN: CPT | Mod: 25,,, | Performed by: INTERNAL MEDICINE

## 2020-05-08 PROCEDURE — 25000003 PHARM REV CODE 250: Performed by: SURGERY

## 2020-05-08 PROCEDURE — 84100 ASSAY OF PHOSPHORUS: CPT

## 2020-05-08 PROCEDURE — 82248 BILIRUBIN DIRECT: CPT

## 2020-05-08 PROCEDURE — 43259 EGD US EXAM DUODENUM/JEJUNUM: CPT | Mod: ,,, | Performed by: INTERNAL MEDICINE

## 2020-05-08 PROCEDURE — 85025 COMPLETE CBC W/AUTO DIFF WBC: CPT

## 2020-05-08 PROCEDURE — 63600175 PHARM REV CODE 636 W HCPCS: Performed by: STUDENT IN AN ORGANIZED HEALTH CARE EDUCATION/TRAINING PROGRAM

## 2020-05-08 PROCEDURE — 25000003 PHARM REV CODE 250: Performed by: NURSE ANESTHETIST, CERTIFIED REGISTERED

## 2020-05-08 PROCEDURE — 43259 PR ENDOSCOPIC ULTRASOUND EXAM: ICD-10-PCS | Mod: ,,, | Performed by: INTERNAL MEDICINE

## 2020-05-08 PROCEDURE — 37000009 HC ANESTHESIA EA ADD 15 MINS: Performed by: INTERNAL MEDICINE

## 2020-05-08 PROCEDURE — 80074 ACUTE HEPATITIS PANEL: CPT

## 2020-05-08 PROCEDURE — 99214 PR OFFICE/OUTPT VISIT, EST, LEVL IV, 30-39 MIN: ICD-10-PCS | Mod: 57,,, | Performed by: SURGERY

## 2020-05-08 PROCEDURE — 96376 TX/PRO/DX INJ SAME DRUG ADON: CPT | Mod: 59

## 2020-05-08 RX ORDER — LIDOCAINE HCL/PF 100 MG/5ML
SYRINGE (ML) INTRAVENOUS
Status: DISCONTINUED | OUTPATIENT
Start: 2020-05-08 | End: 2020-05-08

## 2020-05-08 RX ORDER — SODIUM CHLORIDE 9 MG/ML
INJECTION, SOLUTION INTRAVENOUS CONTINUOUS PRN
Status: DISCONTINUED | OUTPATIENT
Start: 2020-05-08 | End: 2020-05-08

## 2020-05-08 RX ORDER — PROPOFOL 10 MG/ML
VIAL (ML) INTRAVENOUS CONTINUOUS PRN
Status: DISCONTINUED | OUTPATIENT
Start: 2020-05-08 | End: 2020-05-08

## 2020-05-08 RX ORDER — GLYCOPYRROLATE 0.2 MG/ML
INJECTION INTRAMUSCULAR; INTRAVENOUS
Status: DISCONTINUED | OUTPATIENT
Start: 2020-05-08 | End: 2020-05-08

## 2020-05-08 RX ORDER — CEFOXITIN SODIUM 2 G/50ML
2 INJECTION, SOLUTION INTRAVENOUS
Status: COMPLETED | OUTPATIENT
Start: 2020-05-09 | End: 2020-05-09

## 2020-05-08 RX ORDER — INDOCYANINE GREEN AND WATER 25 MG
7.5 KIT INJECTION ONCE
Status: COMPLETED | OUTPATIENT
Start: 2020-05-08 | End: 2020-05-08

## 2020-05-08 RX ORDER — PROPOFOL 10 MG/ML
VIAL (ML) INTRAVENOUS
Status: DISCONTINUED | OUTPATIENT
Start: 2020-05-08 | End: 2020-05-08

## 2020-05-08 RX ADMIN — GLYCOPYRROLATE 0.2 MG: 0.2 INJECTION, SOLUTION INTRAMUSCULAR; INTRAVENOUS at 09:05

## 2020-05-08 RX ADMIN — PROPOFOL 50 MG: 10 INJECTION, EMULSION INTRAVENOUS at 09:05

## 2020-05-08 RX ADMIN — LIDOCAINE HYDROCHLORIDE 50 MG: 20 INJECTION, SOLUTION INTRAVENOUS at 09:05

## 2020-05-08 RX ADMIN — PROPOFOL 150 MCG/KG/MIN: 10 INJECTION, EMULSION INTRAVENOUS at 09:05

## 2020-05-08 RX ADMIN — SODIUM CHLORIDE: 9 INJECTION, SOLUTION INTRAVENOUS at 09:05

## 2020-05-08 RX ADMIN — KETOROLAC TROMETHAMINE 15 MG: 30 INJECTION, SOLUTION INTRAMUSCULAR at 07:05

## 2020-05-08 RX ADMIN — INDOCYANINE GREEN AND WATER 7.5 MG: KIT at 06:05

## 2020-05-08 RX ADMIN — KETOROLAC TROMETHAMINE 15 MG: 30 INJECTION, SOLUTION INTRAMUSCULAR at 09:05

## 2020-05-08 RX ADMIN — PROPOFOL 30 MG: 10 INJECTION, EMULSION INTRAVENOUS at 10:05

## 2020-05-08 RX ADMIN — KETOROLAC TROMETHAMINE 15 MG: 30 INJECTION, SOLUTION INTRAMUSCULAR at 03:05

## 2020-05-08 NOTE — PT/OT/SLP PROGRESS
Physical Therapy      Patient Name:  Alexia Zaragoza   MRN:  [id number]    Patient LEÓN in procedure, will follow up as able.    Mark Dey, PT

## 2020-05-08 NOTE — PLAN OF CARE
TN met with pt for d/c planning. Pt lives with SHAYNE Mobley who is her help at home. Pt independent with ADLS, No HH or DME noted. Pt able to afford medications. Pt prefers to schedule her own follow up appointments at time of d/c. Pt's SO to provide transportation on d/c.       05/08/20 1252   Discharge Assessment   Assessment Type Discharge Planning Assessment   Confirmed/corrected address and phone number on facesheet? Yes   Assessment information obtained from? Patient   Expected Length of Stay (days) 2   Communicated expected length of stay with patient/caregiver yes   Prior to hospitilization cognitive status: Alert/Oriented   Prior to hospitalization functional status: Independent   Current cognitive status: Alert/Oriented   Current Functional Status: Independent   Lives With significant other   Able to Return to Prior Arrangements yes   Is patient able to care for self after discharge? Yes   Who are your caregiver(s) and their phone number(s)? Itz Wolfe (SHAYNE) [phone]   Patient's perception of discharge disposition home or selfcare   Readmission Within the Last 30 Days no previous admission in last 30 days   Patient currently being followed by outpatient case management? No   Patient currently receives any other outside agency services? No   Equipment Currently Used at Home none   Do you have any problems affording any of your prescribed medications? No   Is the patient taking medications as prescribed? yes   Does the patient have transportation home? Yes   Transportation Anticipated family or friend will provide   Does the patient receive services at the [facility]? No   Discharge Plan A Home with family   Discharge Plan B Home with family   DME Needed Upon Discharge  none   Patient/Family in Agreement with Plan yes     Sita Rogers RN-BC  Transitional Navigator  [phone]

## 2020-05-08 NOTE — PLAN OF CARE
Report given to JI Man about the outcome of the procedure. Patient's VSS and no complaints of discomfort noted. Patient ready for transport back to room 521.

## 2020-05-08 NOTE — CONSULTS
[facility]  Gastroenterology  Consult Note    Patient Name: Alexia Zaragoza  MRN: [id number]  Admission Date: 5/7/2020  Hospital Length of Stay: 0 days  Code Status: Full Code   Attending Provider: Kwaku Bauer MD   Consulting Provider: Jojo Tam MD  Primary Care Physician: Hugo Franco MD  Principal Problem:Choledocholithiasis    Inpatient consult to [facility]  Consult performed by: Mick Mace MD  Consult ordered by: Angie Mendez MD        Subjective:     HPI:  59 year old female with a PMH of HTN, HLD, and cluster HA. She presents with a 5 day history of progressively worsening, post-prandial, sharp, burning, RUQ abdominal pain. She has had associated nausea with multiple episodes of non-bilous, non-bloody emesis.  She is now experiencing RUQ and epigastric pain which radiates to her back. She was evaluated at an OSH with significant workup including abdominal US revealing sludge in the dependent portion of the gallbladder and CBD measuring 1 cm in greatest diameter. She denies fever, chills, CP, SOB, changes in BM, and recent sick contacts      Past Medical History:   Diagnosis Date    Cluster headaches     Essential hypertension, benign 7/2/2019    History of colon polyps     History of right hemicolectomy 7/2/2019    Mixed hyperlipidemia 7/10/2019       Past Surgical History:   Procedure Laterality Date    ABDOMINAL HERNIA REPAIR      BACK SURGERY      BREAST BIOPSY Left     exc bx     COLONOSCOPY N/A 12/19/2019    Procedure: COLONOSCOPY;  Surgeon: Dylon Fong MD;  Location: [facility];  Service: General;  Laterality: N/A;    HYSTERECTOMY      RIGHT HEMICOLECTOMY  2014    SLEEVE GASTROPLASTY         Review of patient's allergies indicates:  No Known Allergies  Family History     Problem Relation (Age of Onset)    Alzheimer's disease Mother    Breast cancer Paternal Aunt    Heart disease Brother, Daughter        Tobacco Use    Smoking  status: Never Smoker    Smokeless tobacco: Never Used   Substance and Sexual Activity    Alcohol use: Not Currently     Frequency: Never    Drug use: Never    Sexual activity: Not on file     Review of Systems   Constitutional: Positive for appetite change. Negative for activity change, chills, fever and unexpected weight change.   HENT: Negative for mouth sores, sinus pressure, sinus pain, sore throat and trouble swallowing.    Eyes: Negative for photophobia, redness and itching.   Respiratory: Negative for cough, choking, chest tightness and shortness of breath.    Cardiovascular: Negative for chest pain, palpitations and leg swelling.   Gastrointestinal: Positive for abdominal pain, nausea and vomiting. Negative for abdominal distention, blood in stool and diarrhea.   Endocrine: Negative for polydipsia, polyphagia and polyuria.   Genitourinary: Negative for dysuria, flank pain, frequency and hematuria.   Musculoskeletal: Negative for back pain, gait problem and joint swelling.   Skin: Negative for pallor, rash and wound.   Neurological: Negative for dizziness, tremors, weakness and headaches.   Psychiatric/Behavioral: Negative for agitation and confusion. The patient is not nervous/anxious.      Objective:     Vital Signs (Most Recent):  Temp: 96.4 °F (35.8 °C) (05/08/20 0815)  Pulse: (!) 59 (05/08/20 0815)  Resp: 20 (05/08/20 0815)  BP: (!) 106/54 (05/08/20 0815)  SpO2: 96 % (05/08/20 0347) Vital Signs (24h Range):  Temp:  [96.4 °F (35.8 °C)-99.6 °F (37.6 °C)] 96.4 °F (35.8 °C)  Pulse:  [57-93] 59  Resp:  [18-24] 20  SpO2:  [94 %-99 %] 96 %  BP: (102-145)/(53-91) 106/54     Weight: 71.7 kg (158 lb 1.1 oz) (05/08/20 0347)  Body mass index is 24.76 kg/m².      Intake/Output Summary (Last 24 hours) at 5/8/2020 0909  Last data filed at 5/7/2020 2300  Gross per 24 hour   Intake 0 ml   Output 150 ml   Net -150 ml       Lines/Drains/Airways     Peripheral Intravenous Line                 Peripheral IV - Single  Lumen 05/07/20 1149 20 G Right Forearm less than 1 day                Physical Exam   Constitutional: She is oriented to person, place, and time. She appears well-developed and well-nourished.   HENT:   Head: Normocephalic and atraumatic.   Nose: Nose normal.   Mouth/Throat: No oropharyngeal exudate.   Eyes: Pupils are equal, round, and reactive to light. Conjunctivae and EOM are normal. No scleral icterus.   Neck: Normal range of motion. Neck supple. No tracheal deviation present. No thyromegaly present.   Cardiovascular: Normal rate, regular rhythm and normal heart sounds. Exam reveals no gallop and no friction rub.   No murmur heard.  Pulmonary/Chest: Effort normal and breath sounds normal. No stridor. No respiratory distress. She has no wheezes.   Abdominal: Soft. Bowel sounds are normal. She exhibits no distension, no fluid wave, no ascites and no mass. There is tenderness in the right upper quadrant and epigastric area. There is no rigidity, no rebound and no CVA tenderness.   Musculoskeletal: Normal range of motion. She exhibits no edema.   Lymphadenopathy:     She has no cervical adenopathy.   Neurological: She is alert and oriented to person, place, and time. No cranial nerve deficit or sensory deficit. She exhibits normal muscle tone.   Skin: Skin is warm and dry. Capillary refill takes less than 2 seconds. No rash noted. No erythema.   Psychiatric: She has a normal mood and affect. Her behavior is normal. Thought content normal.   Nursing note and vitals reviewed.      Significant Labs:  CBC:   Recent Labs   Lab 05/06/20  2310 05/07/20  1149 05/08/20  0545   WBC 7.60 9.80 6.77   HGB 12.5 12.3 12.0   HCT 37.2 36.4* 37.1    238 205     CMP:   Recent Labs   Lab 05/08/20  0545   GLU 98   CALCIUM 9.2   ALBUMIN 3.5   PROT 6.8      K 3.6   CO2 28      BUN 13   CREATININE 0.8   ALKPHOS 123   *   *   BILITOT 2.4*     Coagulation: No results for input(s): PT, INR, APTT in the last  48 hours.  Liver Function Test:   Recent Labs   Lab 05/06/20  2310 05/07/20  1149 05/08/20  0545   ALT 22 121* 278*   AST 35 207* 291*   ALKPHOS 65 80 123   BILITOT 0.6 1.6* 2.4*   PROT 7.9 7.3 6.8   ALBUMIN 4.4 4.1 3.5       Significant Imaging:  Imaging results within the past 24 hours have been reviewed.   US abdomen with sludge in dependent portion of GB, CBD measures 1cm in greatest diameter    CTA with no PE  CXR with no acute cardio pulmonary process     Assessment/Plan:     * Choledocholithiasis  - Abd US with sludge in dependent portion of GB, CBD measuring 1cm in greatest diameter  - Significant labs include: T. Bili 2.4, Alk Phos 123, ,   - no current signs of cholangitis:  afebrile, WBC wnl, hemodynamically stable   - nausea and pain control per primary team   - NPO and AC held overnight   - plan for EUS/ERCP today      Elevated LFTs  - additional findings and significant labs as above, all of which have increased since admission  - continue to trend with daily CMP while inpatient     Thank you for your consult. I will follow-up with patient. Please contact us if you have any additional questions.    Mick Mace MD     Case discussed with GI fellow Umair Gupta     Gastroenterology  [facility]

## 2020-05-08 NOTE — CONSULTS
[facility]  INPATIENT H&P    REASON FOR CONSULT:  Choledocholithiasis    HPI: Alexia Zaragoza is a 59 y.o. female who recently presented to [facility] Emergency room Wednesday evening with severe abdominal pain with associated nausea vomiting.  Labs were unremarkable she was subsequently discharged home but returned shortly after with worsening pain, nausea, vomiting, p.o. intolerance and fever.  Lab work at that time was consistent with possible choledocholithiasis.  Patient is transferred [facility] for evaluation by GI for EUS/ERCP.  Underwent EUS this morning at which time no obvious stones or common bile duct dilatation was present.  General surgery is consulted for evaluation for cholecystectomy.    Patient has had an ultrasound which shows biliary sludge.  Currently abdominal pain has resolved and she is not nauseated.  Denies fevers or chills or yellowing of the skin or eyes.  May have had less severe symptoms in the past.  She has concerns about being discharged and having return of the abdominal pain.    I have reviewed the patient's chart including prior progress notes, procedures and testing. The patient reports a history of previous sleeve gastrectomy.  She also has had a bowel resection (reason uncertain) and subsequent hernia formation had a right lower quadrant incision site.  This has been repaired with mesh.  She has also had a lower transverse abdominal incision for hysterectomy.    ROS:   Review of Systems   Constitutional: Positive for appetite change. Negative for diaphoresis and fever.   HENT: Negative for trouble swallowing.    Respiratory: Negative for apnea, chest tightness and shortness of breath.    Cardiovascular: Negative for chest pain, palpitations and leg swelling.   Gastrointestinal: Positive for abdominal pain (Currently resolved), nausea ( resolved) and vomiting ( resolved). Negative for abdominal distention, constipation and diarrhea.    Genitourinary: Negative for difficulty urinating, dyspareunia and hematuria.   Musculoskeletal: Negative for neck pain and neck stiffness.   Skin: Negative for color change, pallor and wound.   Neurological: Positive for headaches. Negative for syncope and weakness.   Psychiatric/Behavioral: Negative for agitation, behavioral problems and confusion.       PROBLEM LIST:  Patient Active Problem List   Diagnosis    Essential hypertension, benign    Mixed hyperlipidemia    Choledocholithiasis    Elevated LFTs         HISTORY  Past Medical History:   Diagnosis Date    Cluster headaches     Essential hypertension, benign 7/2/2019    History of colon polyps     History of right hemicolectomy 7/2/2019    Mixed hyperlipidemia 7/10/2019       Past Surgical History:   Procedure Laterality Date    ABDOMINAL HERNIA REPAIR      BACK SURGERY      BREAST BIOPSY Left     exc bx     COLONOSCOPY N/A 12/19/2019    Procedure: COLONOSCOPY;  Surgeon: Dylon Fong MD;  Location: [facility];  Service: General;  Laterality: N/A;    HYSTERECTOMY      RIGHT HEMICOLECTOMY  2014    SLEEVE GASTROPLASTY         Social History     Tobacco Use    Smoking status: Never Smoker    Smokeless tobacco: Never Used   Substance Use Topics    Alcohol use: Not Currently     Frequency: Never    Drug use: Never       Family History   Problem Relation Age of Onset    Breast cancer Paternal Aunt     Heart disease Brother     Heart disease Daughter     Alzheimer's disease Mother     Colon cancer Neg Hx     Ovarian cancer Neg Hx          MEDS:  No current facility-administered medications on file prior to encounter.      Current Outpatient Medications on File Prior to Encounter   Medication Sig Dispense Refill    rosuvastatin (CRESTOR) 10 MG tablet Take 1 tablet (10 mg total) by mouth once daily. 90 tablet 1    ibuprofen (ADVIL,MOTRIN) 800 MG tablet Take 1 tablet (800 mg total) by mouth 3 (three) times daily as needed for Pain. 30  tablet 2    pantoprazole (PROTONIX) 40 MG tablet Take 1 tablet (40 mg total) by mouth once daily. 30 tablet 0    sucralfate (CARAFATE) 1 gram tablet Take 1 tablet (1 g total) by mouth 4 (four) times daily before meals and nightly. 20 tablet 0       ALLERGIES:  Review of patient's allergies indicates:  No Known Allergies      VITALS:  Temp:  [96.4 °F (35.8 °C)-99.6 °F (37.6 °C)] 96.6 °F (35.9 °C)  Pulse:  [59-93] 62  Resp:  [14-22] 20  SpO2:  [94 %-99 %] 98 %  BP: (101-139)/(51-77) 139/73    I/O last 3 completed shifts:  In: 0   Out: 150 [Urine:150]      PHYSICAL EXAM:  Physical Exam   Constitutional: She is oriented to person, place, and time. She appears well-developed and well-nourished. No distress.   HENT:   Head: Normocephalic and atraumatic.   Nose: Nose normal.   Eyes: Conjunctivae and EOM are normal. No scleral icterus.   Neck: Normal range of motion. Neck supple. No tracheal tenderness present. No tracheal deviation present.   Cardiovascular: Normal rate, regular rhythm and intact distal pulses.   Pulmonary/Chest: Effort normal and breath sounds normal. No accessory muscle usage or stridor. No respiratory distress.   Abdominal: Soft. Normal appearance. She exhibits no distension, no ascites and no mass. There is no tenderness. There is no rebound. No hernia.   Well-healed laparoscopic port sites from sleeve gastrectomy, well-healed right lower quadrant incision, well-healed low transverse abdominal incision   Musculoskeletal: Normal range of motion. She exhibits no edema or deformity.   Neurological: She is alert and oriented to person, place, and time. She exhibits normal muscle tone.   Skin: Skin is warm and dry. No rash noted. She is not diaphoretic. No erythema.   Psychiatric: She has a normal mood and affect. Her behavior is normal. Judgment and thought content normal.   Vitals reviewed.        LABS:  Lab Results   Component Value Date    WBC 6.77 05/08/2020    RBC 4.01 05/08/2020    HGB 12.0  05/08/2020    HCT 37.1 05/08/2020     05/08/2020     Lab Results   Component Value Date    GLU 98 05/08/2020     05/08/2020    K 3.6 05/08/2020     05/08/2020    CO2 28 05/08/2020    BUN 13 05/08/2020    CREATININE 0.8 05/08/2020    CALCIUM 9.2 05/08/2020     Lab Results   Component Value Date     (H) 05/08/2020     (H) 05/08/2020    ALKPHOS 123 05/08/2020    BILITOT 2.4 (H) 05/08/2020     Lab Results   Component Value Date    MG 2.3 05/08/2020    PHOS 3.7 05/08/2020       STUDIES:  US and EUS images and reports were personally reviewed.    Abd US  Impression       2.8 cm lesion in the suprarenal area suggestive of an adrenal adenoma and consistent with the findings on the prior CT pulmonary angiogram.    Sludge in the dependent portion of the gallbladder.    The common bile duct measures 1 cm in greatest diameter.  This could be further assessed with an MRCP to evaluate for a distal common bile duct obstruction.     EUS  Impression:   - Normal esophagus.                        - Normal stomach.                        - Normal examined duodenum.                        - There was no sign of significant pathology in the                         entire pancreas.                        - There was no sign of significant pathology in the                         entire main bile duct.                        - No specimens collected.      ASSESSMENT & PLAN:  59 y.o. female with     Choledocholithiasis  - symptoms and lab work indicate choledocholithiasis, lipase was normal therefore pancreatitis unlikely  - EUS failed to show common bile duct dilatation or intraductal abnormality  - stone/sludge has likely passed  - have recommended cholecystectomy to remove source of stones/sludge and prevent future recurrences  - patient has concerns for recurrence pain and would like like to have her surgery done during this hospitalization  - plan for laparoscopic cholecystectomy tomorrow, 05/09/2020  -  NPO abdomen  - cefoxitin on call to OR

## 2020-05-08 NOTE — H&P
[facility]  History & Physical    Name: Alexia Zaragoza   : 1960  MRN: [id number]    SUBJECTIVE:     History of Present Illness:  58 yo F with PMHx including cluster headaches, HLD, and HTN who presents to [facility] on  as a transfer from [facility] for treatment of choledocholithiasis with plan for GI consult and ERCP on 5/8AM. Pt reports RUQ abdominal pain starting 5 days ago and associated with nausea and NBNB vomiting; the pain progressed to epigastric abdominal pain radiating to the lower chest and back. Pt was initially seen at OSH ED early this morning and evaluated for PE: D-Dimer slightly elevated, CTA negative. Pain resolved with GI cocktail and she was discharged to home with plan for outpatient follow up for suspected GERD. Later this afternoon, pt returned to OSH ED after pain returned (more severe) after eating coffee and crackers at home. Further workup consistent with choledocholithiasis. Case discussed with Dr. Urrutia ([facility]) who recommended transfer to [facility] for ERCP tomorrow morning.    Review of patient's allergies indicates:  No Known Allergies    Past Medical History:   Diagnosis Date    Cluster headaches     Essential hypertension, benign 2019    History of colon polyps     History of right hemicolectomy 2019    Mixed hyperlipidemia 7/10/2019     Past Surgical History:   Procedure Laterality Date    ABDOMINAL HERNIA REPAIR      BACK SURGERY      BREAST BIOPSY Left     exc bx     COLONOSCOPY N/A 2019    Procedure: COLONOSCOPY;  Surgeon: Dylon Fong MD;  Location: [facility];  Service: General;  Laterality: N/A;    HYSTERECTOMY      RIGHT HEMICOLECTOMY  2014    SLEEVE GASTROPLASTY       Family History   Problem Relation Age of Onset    Breast cancer Paternal Aunt     Heart disease Brother     Heart disease Daughter     Alzheimer's disease Mother     Colon cancer Neg Hx     Ovarian cancer Neg Hx      Social History      Tobacco Use    Smoking status: Never Smoker    Smokeless tobacco: Never Used   Substance Use Topics    Alcohol use: Not Currently     Frequency: Never    Drug use: Never      Review of Systems   Constitutional: Negative for chills and fever.   HENT: Negative for congestion, sinus pain and sore throat.    Eyes: Negative for blurred vision, double vision and photophobia.   Respiratory: Negative for cough, shortness of breath and wheezing.    Cardiovascular: Negative for chest pain, palpitations, orthopnea, leg swelling and PND.   Gastrointestinal: Positive for abdominal pain and nausea. Negative for constipation, diarrhea and vomiting.   Genitourinary: Negative for dysuria, flank pain, frequency, hematuria and urgency.   Musculoskeletal: Positive for back pain. Negative for falls, joint pain, myalgias and neck pain.   Skin: Negative for itching and rash.   Neurological: Positive for headaches. Negative for dizziness, loss of consciousness and weakness.   Endo/Heme/Allergies: Negative.    Psychiatric/Behavioral: Negative.      OBJECTIVE:     Vital Signs (Most Recent)  Temp: 99.6 °F (37.6 °C) (05/07/20 2054)  Pulse: 80 (05/07/20 2055)  BP: (!) 124/57 (05/07/20 2054)  SpO2: 99 % (05/07/20 2054)    Physical Exam   Constitutional: She is oriented to person, place, and time and well-developed, well-nourished, and in no distress. No distress.   HENT:   Head: Normocephalic and atraumatic.   Nose: Nose normal.   Mouth/Throat: Oropharynx is clear and moist. No oropharyngeal exudate.   Eyes: Pupils are equal, round, and reactive to light. Conjunctivae and EOM are normal. No scleral icterus.   Neck: Normal range of motion. Neck supple. No JVD present. No thyromegaly present.   Cardiovascular: Normal rate, regular rhythm, normal heart sounds and intact distal pulses.   Pulmonary/Chest: Effort normal and breath sounds normal. No respiratory distress. She has no wheezes.   Abdominal: Soft. Bowel sounds are normal. She  exhibits no distension and no mass. There is tenderness (RUQ to moderate palpation). There is no rebound and no guarding.   Musculoskeletal: Normal range of motion. She exhibits no edema, tenderness or deformity.   Lymphadenopathy:     She has no cervical adenopathy.   Neurological: She is alert and oriented to person, place, and time. No cranial nerve deficit.   Skin: Skin is warm and dry. No rash noted. She is not diaphoretic. No erythema. No pallor.   Psychiatric: Mood, memory, affect and judgment normal.     Laboratory  Recent Labs   Lab 05/06/20 2310 05/07/20  1149   WBC 7.60 9.80   RBC 4.11 4.01   HGB 12.5 12.3   HCT 37.2 36.4*    238   MCV 91 91   MCH 30.4 30.6   MCHC 33.6 33.8     Recent Labs   Lab 05/06/20 2310 05/07/20  1149    140   K 3.4* 3.9   CO2 24 26    103   BUN 20 16   CREATININE <0.3* 0.7     Recent Labs   Lab 05/06/20 2310 05/07/20  1149 05/07/20  2238   CALCIUM 9.2 9.1  --    MG  --   --  2.2   PHOS  --   --  3.3     Recent Labs   Lab 05/06/20 2310 05/07/20  1149   PROT 7.9 7.3   ALBUMIN 4.4 4.1   BILITOT 0.6 1.6*   AST 35 207*   ALKPHOS 65 80   ALT 22 121*     Recent Labs   Lab 05/06/20 2310 05/07/20  1149   TROPONINI <0.01 <0.01     Recent Labs   Lab 05/06/20 2310 05/07/20  1149   BNP 29 66     ASSESSMENT/PLAN:     60 yo F with PMHx including cluster headaches, HLD, and HTN who presents to [facility] on 5/7 as a transfer from [facility] for treatment of choledocholithiasis with plan for GI consult and ERCP on 5/8AM.     Choledocholithiasis:   - Afebrile, VSS  - TBili 1.6, ,   - Abdominal US completed at OSH showed sludge in the dependent portion of the gallbladder and dilation of the CBD (1 cm greatest in diameter) with suggestion for ERCP vs MRCP to evaluate for a distal common bile duct obstruction  - Case discussed with Dr. Urrutia ([facility]), who recommended transfer to [facility] with plan for ERCP in the morning  - Tylenol and Toradol PRN for  pain  - Zofran and Phenergan PRN for nausea   - NPO at midnight for procedure tomorrow  - Continue to monitor    HTN:   - /57 on admit, not currently taking any antihypertensive medications at home  - Stable, will continue to monitor    HLD:   - Per chart review, pt prescribed Crestor as outpatient medication  - However, pt reports recently switching meds, but she does not remember name of new medication  - Will hold off on starting any medications until home med confirmed    Code: Full  PPx: SCDs, PPI  Diet: NPO    Dispo: Pending ERCP tomorrow, further GI recs, and overall clinical improvement.       Angie Mendez MD  [facility] PGY-1  5/7/2020

## 2020-05-08 NOTE — ASSESSMENT & PLAN NOTE
- Abd US  with sludge in dependent portion of GB, CBD measuring 1cm in greatest diameter  - Significant labs include: T. Bili 2.4, Alk Phos 123, ,   - no current signs of cholangitis: afebrile, WBC wnl, hemodynamically stable   - nausea and pain control per primary team   - NPO and AC held overnight   - plan for EUS/ERCP today

## 2020-05-08 NOTE — PT/OT/SLP PROGRESS
Occupational Therapy  Missed Visit    Patient Name:  Alexia Zaragoza   MRN:  [id number]      Orders received. Chart reviewed. OT eval attempted, however pt states that she is (I) & has no OT needs at this time. Instructed pt to have MD/nsg re-consult OT if any phys/fxnl decline during this admission. D/C OT eval/tx orders per pt request.    IMANI Anderson  5/8/2020

## 2020-05-08 NOTE — ASSESSMENT & PLAN NOTE
- significant labs as above, all of which have increased since admission  - continue to trend with daily CMP while inpatient

## 2020-05-08 NOTE — PROVATION PATIENT INSTRUCTIONS
Discharge Summary/Instructions after an Endoscopic Procedure  Patient Name: Alexia Zaragoza  Patient MRN: [id number]  Patient YOB: 1960  Friday, May 8, 2020  Elian Urrutia MD  Your health is very important to us during the Covid Crisis. Following your   procedure today, you will receive a daily text for 2 weeks asking about   signs or symptoms of Covid 19.  Please respond to this text when you   receive it so we can follow up and keep you as safe as possible.   RESTRICTIONS:  During your procedure today, you received medications for sedation.  These   medications may affect your judgment, balance and coordination.  Therefore,   for 24 hours, you have the following restrictions:   - DO NOT drive a car, operate machinery, make legal/financial decisions,   sign important papers or drink alcohol.    ACTIVITY:  Today: no heavy lifting, straining or running due to procedural   sedation/anesthesia.  The following day: return to full activity including work.  DIET:  Eat and drink normally unless instructed otherwise.     TREATMENT FOR COMMON SIDE EFFECTS:  - Mild abdominal pain, nausea, belching, bloating or excessive gas:  rest,   eat lightly and use a heating pad.  - Sore Throat: treat with throat lozenges and/or gargle with warm salt   water.  - Because air was used during the procedure, expelling large amounts of air   from your rectum or belching is normal.  - If a bowel prep was taken, you may not have a bowel movement for 1-3 days.    This is normal.  SYMPTOMS TO WATCH FOR AND REPORT TO YOUR PHYSICIAN:  1. Abdominal pain or bloating, other than gas cramps.  2. Chest pain.  3. Back pain.  4. Signs of infection such as: chills or fever occurring within 24 hours   after the procedure.  5. Rectal bleeding, which would show as bright red, maroon, or black stools.   (A tablespoon of blood from the rectum is not serious, especially if   hemorrhoids are present.)  6. Vomiting.  7. Weakness or dizziness.  GO DIRECTLY  TO THE NEAREST EMERGENCY ROOM IF YOU HAVE ANY OF THE FOLLOWING:      Difficulty breathing              Chills and/or fever over 101 F   Persistent vomiting and/or vomiting blood   Severe abdominal pain   Severe chest pain   Black, tarry stools   Bleeding- more than one tablespoon   Any other symptom or condition that you feel may need urgent attention  Your doctor recommends these additional instructions:  If any biopsies were taken, your doctors clinic will contact you in 1 to 2   weeks with any results.  - Resume previous diet.   - Continue present medications.   - Refer to a surgeon at appointment to be scheduled.   - Return patient to hospital lin for ongoing care.  For questions, problems or results please call your physician - Elian Urrutia MD at Work:  [phone].  EMERGENCY PHONE NUMBER: [phone],  LAB RESULTS: [phone]  IF A COMPLICATION OR EMERGENCY SITUATION ARISES AND YOU ARE UNABLE TO REACH   YOUR PHYSICIAN - GO DIRECTLY TO THE EMERGENCY ROOM.  Elian Urrutia MD  5/8/2020 10:14:56 AM  This report has been verified and signed electronically.  [employer]

## 2020-05-08 NOTE — TELEPHONE ENCOUNTER
"Discussion with patient yesterday related to c/o severe chest pain. Pain described as 10/10, epigastric, radiating posteriorly to back. Constant with no relieving factors. Has tried GI cocktail which was given to her in the ER earlier in the day with no relief. Associated vomiting, no hematemesis. Patient crying on the phone as she describes her pain as "unbearable". Instructed the patient to proceed to the closest ER, daughter is on her way. If daughter unable to pick her up in the immediate future she needs to call 911.  "

## 2020-05-08 NOTE — PT/OT/SLP PROGRESS
Occupational Therapy  Missed Eval    Patient Name:  Alexia Zaragoza   MRN:  [id number]    Patient not seen today secondary to going [facility] for procedure. Will follow-up as able.    IMANI Anderson  5/8/2020

## 2020-05-08 NOTE — PLAN OF CARE
VN cued into room for introduction and admit assessment. Informed patient that VN would be working alongside bedside nurse and PCT throughout shift.  Education provided and documented under education tab.  Patient educated on safety measures. Call bell in reach and instructed on how to call for assistance.  Patient verbalized all understanding.  Medical team resident now at bedside for assessment.

## 2020-05-08 NOTE — PROGRESS NOTES
Progress Note    Admit Date: 5/7/2020   LOS: 0 days     SUBJECTIVE:       The patient was seen and examined this morning at the bedside. Patient reports no acute issues overnight. Patient reports that pain is adequately controlled. Patient denies decreased urinary output.Patient denies fevers overnight.      Denies f/c/n/v/d/c/cp/palpitations/freq/urgency/dysuria    Continuous Infusions:  Scheduled Meds:   [START ON 5/9/2020] pantoprazole  40 mg Oral Daily     PRN Meds:acetaminophen, Dextrose 10% Bolus, Dextrose 10% Bolus, glucagon (human recombinant), glucose, glucose, ketorolac, melatonin, ondansetron, promethazine (PHENERGAN) IVPB, sodium chloride 0.9%    Review of patient's allergies indicates:  No Known Allergies    OBJECTIVE:     Vital Signs (Most Recent)  Temp: 96.4 °F (35.8 °C) (05/08/20 0815)  Pulse: (!) 59 (05/08/20 0815)  Resp: 20 (05/08/20 0815)  BP: (!) 106/54 (05/08/20 0815)  SpO2: 96 % (05/08/20 0347)    Vital Signs Range (Last 24H):  Temp:  [96.4 °F (35.8 °C)-99.6 °F (37.6 °C)]   Pulse:  [57-93]   Resp:  [18-24]   BP: (102-145)/(53-91)   SpO2:  [94 %-99 %]     I & O (Last 24H):    Intake/Output Summary (Last 24 hours) at 5/8/2020 0844  Last data filed at 5/7/2020 2300  Gross per 24 hour   Intake 0 ml   Output 150 ml   Net -150 ml     Ventilator Data (Last 24H):          Physical Exam:    GEN - AAOx4, NAD  Oral - MMM; no exudate  CHEST - CTA B ;equal expansion  HEART - rrr; no m/r/s3/s4  ABD - soft; mild ttp of the RUQ   EXTR - warm; no edema  NEURO - no asterixis or focal deficits   SKIN - no obvious skin breakdown or rash    Lines/Drains:       Peripheral IV - Single Lumen 05/07/20 1149 20 G Right Forearm (Active)   Site Assessment Clean;Dry;Intact;No redness;No swelling 5/8/2020  4:00 AM   Line Status Flushed;Saline locked 5/7/2020  8:54 PM   Dressing Status Clean;Dry;Intact 5/7/2020  8:54 PM   Dressing Intervention First dressing 5/7/2020  8:54 PM   Reason Not Rotated Not due 5/7/2020  8:54 PM    Number of days: 0       Recent Labs     05/06/20  2310 05/07/20  1149 05/07/20  2238 05/08/20  0545   WBC 7.60 9.80  --  6.77   HGB 12.5 12.3  --  12.0   HCT 37.2 36.4*  --  37.1    238  --  205    140  --  140   K 3.4* 3.9  --  3.6    103  --  104   CO2 24 26  --  28   BUN 20 16  --  13   CREATININE <0.3* 0.7  --  0.8   BILITOT 0.6 1.6*  --  2.4*   AST 35 207*  --  291*   ALT 22 121*  --  278*   ALKPHOS 65 80  --  123   CALCIUM 9.2 9.1  --  9.2   ALBUMIN 4.4 4.1  --  3.5   PROT 7.9 7.3  --  6.8   MG  --   --  2.2 2.3   PHOS  --   --  3.3 3.7         ASSESSMENT/PLAN:     Alexia Zaragoza is a 59 y.o. female with pmh  has a past medical history of Cluster headaches, Essential hypertension, benign (7/2/2019), History of colon polyps, History of right hemicolectomy (7/2/2019), and Mixed hyperlipidemia (7/10/2019).   who presented with Choledocholithiasis. The primary encounter diagnosis was Mixed hyperlipidemia. Diagnoses of Choledocholithiasis and Elevated LFTs were also pertinent to this visit.    Choledocholithiasis:   - Afebrile, VSS  - TBili 1.6, ,   - Abdominal US completed at OSH showed sludge in the dependent portion of the gallbladder and dilation of the CBD (1 cm greatest in diameter) with suggestion for ERCP vs MRCP to evaluate for a distal common bile duct obstruction  - Case discussed with Dr. Urrutia ([facility]), who recommended transfer to [facility] [place] with plan for ERCP in the morning  - Tylenol and Toradol PRN for pain  - Zofran and Phenergan PRN for nausea   - NPO at midnight for procedure today  - Pain management   - Continue to monitor     HTN:   - /57 on admit, not currently taking any antihypertensive medications at home  - Stable, will continue to monitor     HLD:   - hold statin in setting of elevated liver ezymes      Code: Full  PPx: SCDs, PPI  Diet: NPO     Dispo: Pending ERCP, further GI recs, and overall clinical improvement.         Kandi Medel MD    [facility]   05/08/2020

## 2020-05-08 NOTE — H&P
Short Stay Endoscopy History and Physical    PCP - Hugo Franco MD  Referring Physician - Duke Puga MD  [address]  [address]  [place] [address]    Procedure - eus/ercp  ASA - per anesthesia  Mallampati - per anesthesia  History of Anesthesia problems - no  Family history Anesthesia problems -  no   Plan of anesthesia - General    HPI:  This is a 59 y.o. female here for evaluation of: cbd stone    Reflux - no  Dysphagia - no  Abdominal pain - no  Diarrhea - no    ROS:  Constitutional: No fevers, chills, No weight loss  CV: No chest pain  Pulm: No cough, No shortness of breath  Ophtho: No vision changes  GI: see HPI  Derm: No rash    Medical History:  has a past medical history of Cluster headaches, Essential hypertension, benign (7/2/2019), History of colon polyps, History of right hemicolectomy (7/2/2019), and Mixed hyperlipidemia (7/10/2019).    Surgical History:  has a past surgical history that includes Hysterectomy; Abdominal hernia repair; Back surgery; Sleeve Gastroplasty; Right hemicolectomy (2014); Breast biopsy (Left); and Colonoscopy (N/A, 12/19/2019).    Family History: family history includes Alzheimer's disease in her mother; Breast cancer in her paternal aunt; Heart disease in her brother and daughter..    Social History:  reports that she has never smoked. She has never used smokeless tobacco. She reports that she drank alcohol. She reports that she does not use drugs.    Review of patient's allergies indicates:  No Known Allergies    Medications:   Medications Prior to Admission   Medication Sig Dispense Refill Last Dose    rosuvastatin (CRESTOR) 10 MG tablet Take 1 tablet (10 mg total) by mouth once daily. 90 tablet 1 5/7/2020 at Unknown time    ibuprofen (ADVIL,MOTRIN) 800 MG tablet Take 1 tablet (800 mg total) by mouth 3 (three) times daily as needed for Pain. 30 tablet 2     pantoprazole (PROTONIX) 40 MG tablet Take 1 tablet (40 mg total) by mouth once daily. 30  tablet 0     sucralfate (CARAFATE) 1 gram tablet Take 1 tablet (1 g total) by mouth 4 (four) times daily before meals and nightly. 20 tablet 0        Physical Exam:    Vital Signs:   Vitals:    05/08/20 0815   BP: (!) 106/54   Pulse: (!) 59   Resp: 20   Temp: 96.4 °F (35.8 °C)       General Appearance: Well appearing in no acute distress    Labs:  Lab Results   Component Value Date    WBC 6.77 05/08/2020    HGB 12.0 05/08/2020    HCT 37.1 05/08/2020     05/08/2020    CHOL 199 03/09/2020    TRIG 164 (H) 03/09/2020    HDL 51 03/09/2020     (H) 05/08/2020     (H) 05/08/2020     05/08/2020    K 3.6 05/08/2020     05/08/2020    CREATININE 0.8 05/08/2020    BUN 13 05/08/2020    CO2 28 05/08/2020    TSH 0.761 05/07/2020    HGBA1C 5.8 (H) 05/07/2020       I have explained the risks and benefits of this endoscopic procedure to the patient including but not limited to bleeding, inflammation, infection, perforation, and death.      Elian Urrutia MD

## 2020-05-08 NOTE — SUBJECTIVE & OBJECTIVE
Past Medical History:   Diagnosis Date    Cluster headaches     Essential hypertension, benign 7/2/2019    History of colon polyps     History of right hemicolectomy 7/2/2019    Mixed hyperlipidemia 7/10/2019       Past Surgical History:   Procedure Laterality Date    ABDOMINAL HERNIA REPAIR      BACK SURGERY      BREAST BIOPSY Left     exc bx     COLONOSCOPY N/A 12/19/2019    Procedure: COLONOSCOPY;  Surgeon: Dylon Fong MD;  Location: [facility];  Service: General;  Laterality: N/A;    HYSTERECTOMY      RIGHT HEMICOLECTOMY  2014    SLEEVE GASTROPLASTY         Review of patient's allergies indicates:  No Known Allergies  Family History     Problem Relation (Age of Onset)    Alzheimer's disease Mother    Breast cancer Paternal Aunt    Heart disease Brother, Daughter        Tobacco Use    Smoking status: Never Smoker    Smokeless tobacco: Never Used   Substance and Sexual Activity    Alcohol use: Not Currently     Frequency: Never    Drug use: Never    Sexual activity: Not on file     Review of Systems   Constitutional: Positive for appetite change. Negative for activity change, chills, fever and unexpected weight change.   HENT: Negative for mouth sores, sinus pressure, sinus pain, sore throat and trouble swallowing.    Eyes: Negative for photophobia, redness and itching.   Respiratory: Negative for cough, choking, chest tightness and shortness of breath.    Cardiovascular: Negative for chest pain, palpitations and leg swelling.   Gastrointestinal: Positive for abdominal pain, nausea and vomiting. Negative for abdominal distention, blood in stool and diarrhea.   Endocrine: Negative for polydipsia, polyphagia and polyuria.   Genitourinary: Negative for dysuria, flank pain, frequency and hematuria.   Musculoskeletal: Negative for back pain, gait problem and joint swelling.   Skin: Negative for pallor, rash and wound.   Neurological: Negative for dizziness, tremors, weakness and headaches.    Psychiatric/Behavioral: Negative for agitation and confusion. The patient is not nervous/anxious.      Objective:     Vital Signs (Most Recent):  Temp: 96.4 °F (35.8 °C) (05/08/20 0815)  Pulse: (!) 59 (05/08/20 0815)  Resp: 20 (05/08/20 0815)  BP: (!) 106/54 (05/08/20 0815)  SpO2: 96 % (05/08/20 0347) Vital Signs (24h Range):  Temp:  [96.4 °F (35.8 °C)-99.6 °F (37.6 °C)] 96.4 °F (35.8 °C)  Pulse:  [57-93] 59  Resp:  [18-24] 20  SpO2:  [94 %-99 %] 96 %  BP: (102-145)/(53-91) 106/54     Weight: 71.7 kg (158 lb 1.1 oz) (05/08/20 0347)  Body mass index is 24.76 kg/m².      Intake/Output Summary (Last 24 hours) at 5/8/2020 0909  Last data filed at 5/7/2020 2300  Gross per 24 hour   Intake 0 ml   Output 150 ml   Net -150 ml       Lines/Drains/Airways     Peripheral Intravenous Line                 Peripheral IV - Single Lumen 05/07/20 1149 20 G Right Forearm less than 1 day                Physical Exam   Constitutional: She is oriented to person, place, and time. She appears well-developed and well-nourished.   HENT:   Head: Normocephalic and atraumatic.   Nose: Nose normal.   Mouth/Throat: No oropharyngeal exudate.   Eyes: Pupils are equal, round, and reactive to light. Conjunctivae and EOM are normal. No scleral icterus.   Neck: Normal range of motion. Neck supple. No tracheal deviation present. No thyromegaly present.   Cardiovascular: Normal rate, regular rhythm and normal heart sounds. Exam reveals no gallop and no friction rub.   No murmur heard.  Pulmonary/Chest: Effort normal and breath sounds normal. No stridor. No respiratory distress. She has no wheezes.   Abdominal: Soft. Bowel sounds are normal. She exhibits no distension, no fluid wave, no ascites and no mass. There is tenderness in the right upper quadrant and epigastric area. There is no rigidity, no rebound and no CVA tenderness.   Musculoskeletal: Normal range of motion. She exhibits no edema.   Lymphadenopathy:     She has no cervical adenopathy.    Neurological: She is alert and oriented to person, place, and time. No cranial nerve deficit or sensory deficit. She exhibits normal muscle tone.   Skin: Skin is warm and dry. Capillary refill takes less than 2 seconds. No rash noted. No erythema.   Psychiatric: She has a normal mood and affect. Her behavior is normal. Thought content normal.   Nursing note and vitals reviewed.      Significant Labs:  CBC:   Recent Labs   Lab 05/06/20 2310 05/07/20  1149 05/08/20  0545   WBC 7.60 9.80 6.77   HGB 12.5 12.3 12.0   HCT 37.2 36.4* 37.1    238 205     CMP:   Recent Labs   Lab 05/08/20  0545   GLU 98   CALCIUM 9.2   ALBUMIN 3.5   PROT 6.8      K 3.6   CO2 28      BUN 13   CREATININE 0.8   ALKPHOS 123   *   *   BILITOT 2.4*     Coagulation: No results for input(s): PT, INR, APTT in the last 48 hours.  Liver Function Test:   Recent Labs   Lab 05/06/20 2310 05/07/20  1149 05/08/20  0545   ALT 22 121* 278*   AST 35 207* 291*   ALKPHOS 65 80 123   BILITOT 0.6 1.6* 2.4*   PROT 7.9 7.3 6.8   ALBUMIN 4.4 4.1 3.5       Significant Imaging:  Imaging results within the past 24 hours have been reviewed.   US abdomen with sludge in dependent portion of GB, CBD measures 1cm in greatest diameter    CTA with no PE  CXR with no acute cardio pulmonary process

## 2020-05-08 NOTE — PT/OT/SLP PROGRESS
Physical Therapy  Discharge     Patient Name:  Alexia Zaragoza   MRN:  [id number]    Patient refused PT service, reports she is independent in no need of PT service. Will DC PT service per patient's request.      Mark Dey, PT

## 2020-05-08 NOTE — NURSING
Patient arrived to floor. VSS. ambulated to bathroom. MD team notified about patient's arrival and complaint of headache. MD on call stated that she would be at bedside soon. No SOB,NV noted. Oriented to room and VN at bedside obtained admit answers for questions. Will continue to monitor.

## 2020-05-08 NOTE — ANESTHESIA PREPROCEDURE EVALUATION
05/08/2020  Alexia Zaragoza is a 59 y.o., female. Scheduled for lap christie for choledocholithiasis/abdominal pain.    Some nausea during episodes of epigastric pain, most recently morning of 5/7/20 but none since, no vomiting.    Anesthesia Evaluation    I have reviewed the Patient Summary Reports.    I have reviewed the Nursing Notes.   I have reviewed the Medications.     Review of Systems  Anesthesia Hx:  Denies Hx of Anesthetic complications  Denies Family Hx of Anesthesia complications.   Denies Personal Hx of Anesthesia complications.   Social:  Non-Smoker    Hematology/Oncology:  Hematology Normal        Cardiovascular:   Exercise tolerance: good Hypertension hyperlipidemia ECG has been reviewed.  Functional Capacity good / => 4 METS  Hypertension    Pulmonary:  Pulmonary Normal    Renal/:  Renal/ Normal     Hepatic/GI:   GERD Choledocholithiasis  S/p gastric sleeve   Musculoskeletal:   Hx of low back surgery   OB/GYN/PEDS:  S/p hysterectomy   Neurological:   Headaches  Dx of Headaches   Endocrine:  Endocrine Normal        Physical Exam  General:  Well nourished    Airway/Jaw/Neck:  Airway Findings: Mouth Opening: Normal Tongue: Normal  General Airway Assessment: Adult  Mallampati: II  TM Distance: Normal, at least 6 cm  Jaw/Neck Findings:  Neck ROM: Normal ROM      Dental:  Dental Findings:Veneers upper and lower   Chest/Lungs:  Chest/Lungs Findings: Clear to auscultation     Heart/Vascular:  Heart Findings: Rate: Normal      Musculoskeletal:  Musculoskeletal Findings: Normal   Skin:  Skin Findings: Normal    Mental Status:  Mental Status Findings:  Cooperative, Alert and Oriented       Lab Results   Component Value Date    WBC 6.77 05/08/2020    HGB 12.0 05/08/2020    HCT 37.1 05/08/2020     05/08/2020    CHOL 199 03/09/2020    TRIG 164 (H) 03/09/2020    HDL 51 03/09/2020     (H)  05/08/2020     (H) 05/08/2020     05/08/2020    K 3.6 05/08/2020     05/08/2020    CREATININE 0.8 05/08/2020    BUN 13 05/08/2020    CO2 28 05/08/2020    TSH 0.761 05/07/2020    HGBA1C 5.8 (H) 05/07/2020         Anesthesia Plan  Type of Anesthesia, risks & benefits discussed:  Anesthesia Type:  general  Patient's Preference:   Intra-op Monitoring Plan: standard ASA monitors  Intra-op Monitoring Plan Comments:   Post Op Pain Control Plan: multimodal analgesia  Post Op Pain Control Plan Comments:   Induction:   IV  Beta Blocker:  Patient is not currently on a Beta-Blocker (No further documentation required).       Informed Consent: Patient understands risks and agrees with Anesthesia plan.  Questions answered. Anesthesia consent signed with patient.  ASA Score: 3     Day of Surgery Review of History & Physical: I have interviewed and examined the patient. I have reviewed the patient's H&P dated:    H&P update referred to the surgeon.         Ready For Surgery From Anesthesia Perspective.

## 2020-05-08 NOTE — PLAN OF CARE
Problem: Adult Inpatient Plan of Care  Goal: Plan of Care Review  Outcome: Ongoing, Progressing  Ms. Percle is resting and PRN medication given for PAIN. VSS. Ambulated to bathroom. No complaints of SON/NV. Safety maintained.

## 2020-05-08 NOTE — ANESTHESIA POSTPROCEDURE EVALUATION
Anesthesia Post Evaluation    Patient: Alexia Zaragoza    Procedure(s) Performed: Procedure(s) (LRB):  ULTRASOUND, UPPER GI TRACT, ENDOSCOPIC (N/A)    Final Anesthesia Type: MAC    Patient location during evaluation: GI PACU  Patient participation: Yes- Able to Participate  Level of consciousness: awake and alert  Post-procedure vital signs: reviewed and stable  Pain management: adequate  Airway patency: patent  ALVINO mitigation strategies: Multimodal analgesia  PONV status at discharge: No PONV  Anesthetic complications: no      Cardiovascular status: hemodynamically stable and blood pressure returned to baseline  Respiratory status: room air, unassisted and spontaneous ventilation  Hydration status: euvolemic  Follow-up not needed.          Vitals Value Taken Time   /56 5/8/2020 11:00 AM   Temp 36.8 °C (98.2 °F) 5/8/2020 11:00 AM   Pulse 60 5/8/2020 11:00 AM   Resp 17 5/8/2020 11:00 AM   SpO2 98 % 5/8/2020 11:00 AM         Event Time     Out of Recovery 05/08/2020 10:49:02          Pain/Mary Ellen Score: Pain Rating Prior to Med Admin: 4 (5/8/2020  7:28 AM)  Pain Rating Post Med Admin: 3 (5/7/2020 10:45 PM)  Mary Ellen Score: 10 (5/8/2020 10:42 AM)

## 2020-05-08 NOTE — HPI
59 year old female with a PMH of HTN, HLD, and cluster HA. She presents with a 5 day history of progressively worsening, post-prandial, sharp, burning, RUQ abdominal pain. She has had associated nausea with multiple episodes of non-bilous, non-bloody emesis.  She is now experiencing RUQ and epigastric pain which radiates to her back. She was evaluated at an OSH with significant workup including abdominal US revealing sludge in the dependent portion of the gallbladder and CBD measuring 1 cm in greatest diameter. She denies fever, chills, CP, SOB, changes in BM, and recent sick contacts

## 2020-05-08 NOTE — ANESTHESIA PREPROCEDURE EVALUATION
05/08/2020  Alexia Zaragoza is a 59 y.o., female. Scheduled for ERCP for choledocholithiasis.    Some nausea during episodes of epigastric pain, most recently morning of 5/7/20 but none since, no vomiting.    Pre-op Assessment    I have reviewed the Patient Summary Reports.     I have reviewed the Nursing Notes.   I have reviewed the Medications.     Review of Systems  Anesthesia Hx:  Denies Hx of Anesthetic complications  Denies Family Hx of Anesthesia complications.   Denies Personal Hx of Anesthesia complications.   Social:  Non-Smoker    Hematology/Oncology:  Hematology Normal        Cardiovascular:   Exercise tolerance: good Hypertension hyperlipidemia ECG has been reviewed.  Functional Capacity good / => 4 METS  Hypertension    Pulmonary:  Pulmonary Normal    Renal/:  Renal/ Normal     Hepatic/GI:   GERD Choledocholithiasis  S/p gastric sleeve   Musculoskeletal:   Hx of low back surgery   OB/GYN/PEDS:  S/p hysterectomy   Neurological:   Headaches  Dx of Headaches   Endocrine:  Endocrine Normal        Physical Exam  General:  Well nourished    Airway/Jaw/Neck:  Airway Findings: Mouth Opening: Normal Tongue: Normal  General Airway Assessment: Adult  Mallampati: II  TM Distance: Normal, at least 6 cm  Jaw/Neck Findings:  Neck ROM: Normal ROM      Dental:  Dental Findings:Veneers upper and lower   Chest/Lungs:  Chest/Lungs Findings: Clear to auscultation     Heart/Vascular:  Heart Findings: Rate: Normal      Musculoskeletal:  Musculoskeletal Findings: Normal   Skin:  Skin Findings: Normal    Mental Status:  Mental Status Findings:  Cooperative, Alert and Oriented       Lab Results   Component Value Date    WBC 6.77 05/08/2020    HGB 12.0 05/08/2020    HCT 37.1 05/08/2020     05/08/2020    CHOL 199 03/09/2020    TRIG 164 (H) 03/09/2020    HDL 51 03/09/2020     (H) 05/08/2020     (H)  05/08/2020     05/08/2020    K 3.6 05/08/2020     05/08/2020    CREATININE 0.8 05/08/2020    BUN 13 05/08/2020    CO2 28 05/08/2020    TSH 0.761 05/07/2020    HGBA1C 5.8 (H) 05/07/2020         Anesthesia Plan  Type of Anesthesia, risks & benefits discussed:  Anesthesia Type:  general, MAC  Patient's Preference:   Intra-op Monitoring Plan: standard ASA monitors  Intra-op Monitoring Plan Comments:   Post Op Pain Control Plan: multimodal analgesia  Post Op Pain Control Plan Comments:   Induction:   IV  Beta Blocker:  Patient is not currently on a Beta-Blocker (No further documentation required).       Informed Consent: Patient understands risks and agrees with Anesthesia plan.  Questions answered. Anesthesia consent signed with patient.  ASA Score: 3     Day of Surgery Review of History & Physical: I have interviewed and examined the patient. I have reviewed the patient's H&P dated:    H&P update referred to the surgeon.         Ready For Surgery From Anesthesia Perspective.

## 2020-05-08 NOTE — TRANSFER OF CARE
"Anesthesia Transfer of Care Note    Patient: Alexia Zaragoza    Procedure(s) Performed: Procedure(s) (LRB):  ULTRASOUND, UPPER GI TRACT, ENDOSCOPIC (N/A)    Patient location: GI    Anesthesia Type: MAC    Transport from OR: Transported from OR on room air with adequate spontaneous ventilation    Post pain: adequate analgesia    Post assessment: no apparent anesthetic complications and tolerated procedure well    Post vital signs: stable    Level of consciousness: awake, alert and oriented    Nausea/Vomiting: no nausea/vomiting    Complications: none    Transfer of care protocol was followed      Last vitals:   Visit Vitals  BP (!) 106/54 (Patient Position: Lying)   Pulse (!) 59   Temp 35.8 °C (96.4 °F) (Oral)   Resp 20   Ht 5' 7" (1.702 m)   Wt 71.7 kg (158 lb 1.1 oz)   SpO2 96%   Breastfeeding? No   BMI 24.76 kg/m²     "

## 2020-05-09 ENCOUNTER — ANESTHESIA (OUTPATIENT)
Dept: SURGERY | Facility: HOSPITAL | Age: 60
End: 2020-05-09
Payer: COMMERCIAL

## 2020-05-09 LAB
ALBUMIN SERPL BCP-MCNC: 3.6 G/DL (ref 3.5–5.2)
ALP SERPL-CCNC: 173 U/L (ref 55–135)
ALT SERPL W/O P-5'-P-CCNC: 220 U/L (ref 10–44)
ANION GAP SERPL CALC-SCNC: 11 MMOL/L (ref 8–16)
AST SERPL-CCNC: 147 U/L (ref 10–40)
BASOPHILS # BLD AUTO: 0.02 K/UL (ref 0–0.2)
BASOPHILS NFR BLD: 0.4 % (ref 0–1.9)
BILIRUB SERPL-MCNC: 1.5 MG/DL (ref 0.1–1)
BUN SERPL-MCNC: 21 MG/DL (ref 6–20)
CALCIUM SERPL-MCNC: 9.2 MG/DL (ref 8.7–10.5)
CHLORIDE SERPL-SCNC: 107 MMOL/L (ref 95–110)
CO2 SERPL-SCNC: 22 MMOL/L (ref 23–29)
CREAT SERPL-MCNC: 0.8 MG/DL (ref 0.5–1.4)
DIFFERENTIAL METHOD: ABNORMAL
EOSINOPHIL # BLD AUTO: 0.3 K/UL (ref 0–0.5)
EOSINOPHIL NFR BLD: 4.7 % (ref 0–8)
ERYTHROCYTE [DISTWIDTH] IN BLOOD BY AUTOMATED COUNT: 12.4 % (ref 11.5–14.5)
EST. GFR  (AFRICAN AMERICAN): >60 ML/MIN/1.73 M^2
EST. GFR  (NON AFRICAN AMERICAN): >60 ML/MIN/1.73 M^2
GLUCOSE SERPL-MCNC: 81 MG/DL (ref 70–110)
HCT VFR BLD AUTO: 39.4 % (ref 37–48.5)
HGB BLD-MCNC: 12.3 G/DL (ref 12–16)
IMM GRANULOCYTES # BLD AUTO: 0.02 K/UL (ref 0–0.04)
IMM GRANULOCYTES NFR BLD AUTO: 0.4 % (ref 0–0.5)
LYMPHOCYTES # BLD AUTO: 1.3 K/UL (ref 1–4.8)
LYMPHOCYTES NFR BLD: 24.6 % (ref 18–48)
MAGNESIUM SERPL-MCNC: 2.2 MG/DL (ref 1.6–2.6)
MCH RBC QN AUTO: 29.5 PG (ref 27–31)
MCHC RBC AUTO-ENTMCNC: 31.2 G/DL (ref 32–36)
MCV RBC AUTO: 95 FL (ref 82–98)
MONOCYTES # BLD AUTO: 0.7 K/UL (ref 0.3–1)
MONOCYTES NFR BLD: 13.3 % (ref 4–15)
NEUTROPHILS # BLD AUTO: 3 K/UL (ref 1.8–7.7)
NEUTROPHILS NFR BLD: 56.6 % (ref 38–73)
NRBC BLD-RTO: 0 /100 WBC
PHOSPHATE SERPL-MCNC: 3.8 MG/DL (ref 2.7–4.5)
PLATELET # BLD AUTO: 208 K/UL (ref 150–350)
PMV BLD AUTO: 9.3 FL (ref 9.2–12.9)
POTASSIUM SERPL-SCNC: 3.7 MMOL/L (ref 3.5–5.1)
PROT SERPL-MCNC: 7.1 G/DL (ref 6–8.4)
RBC # BLD AUTO: 4.17 M/UL (ref 4–5.4)
SODIUM SERPL-SCNC: 140 MMOL/L (ref 136–145)
WBC # BLD AUTO: 5.32 K/UL (ref 3.9–12.7)

## 2020-05-09 PROCEDURE — 36000710: Performed by: SURGERY

## 2020-05-09 PROCEDURE — 25000003 PHARM REV CODE 250: Performed by: SURGERY

## 2020-05-09 PROCEDURE — 80053 COMPREHEN METABOLIC PANEL: CPT

## 2020-05-09 PROCEDURE — 96372 THER/PROPH/DIAG INJ SC/IM: CPT

## 2020-05-09 PROCEDURE — 99213 OFFICE O/P EST LOW 20 MIN: CPT | Mod: 57,,, | Performed by: SURGERY

## 2020-05-09 PROCEDURE — 47562 LAPAROSCOPIC CHOLECYSTECTOMY: CPT | Mod: ,,, | Performed by: SURGERY

## 2020-05-09 PROCEDURE — 63600175 PHARM REV CODE 636 W HCPCS: Performed by: STUDENT IN AN ORGANIZED HEALTH CARE EDUCATION/TRAINING PROGRAM

## 2020-05-09 PROCEDURE — 99213 PR OFFICE/OUTPT VISIT, EST, LEVL III, 20-29 MIN: ICD-10-PCS | Mod: 57,,, | Performed by: SURGERY

## 2020-05-09 PROCEDURE — 88304 TISSUE EXAM BY PATHOLOGIST: CPT | Mod: 26,,, | Performed by: PATHOLOGY

## 2020-05-09 PROCEDURE — 36000711: Performed by: SURGERY

## 2020-05-09 PROCEDURE — 36415 COLL VENOUS BLD VENIPUNCTURE: CPT

## 2020-05-09 PROCEDURE — 84100 ASSAY OF PHOSPHORUS: CPT

## 2020-05-09 PROCEDURE — 88304 TISSUE EXAM BY PATHOLOGIST: CPT | Performed by: PATHOLOGY

## 2020-05-09 PROCEDURE — 37000009 HC ANESTHESIA EA ADD 15 MINS: Performed by: SURGERY

## 2020-05-09 PROCEDURE — 88304 PR  SURG PATH,LEVEL III: ICD-10-PCS | Mod: 26,,, | Performed by: PATHOLOGY

## 2020-05-09 PROCEDURE — 63600175 PHARM REV CODE 636 W HCPCS: Performed by: SURGERY

## 2020-05-09 PROCEDURE — 47562 PR LAP,CHOLECYSTECTOMY: ICD-10-PCS | Mod: ,,, | Performed by: SURGERY

## 2020-05-09 PROCEDURE — 83735 ASSAY OF MAGNESIUM: CPT

## 2020-05-09 PROCEDURE — 71000033 HC RECOVERY, INTIAL HOUR: Performed by: SURGERY

## 2020-05-09 PROCEDURE — 85025 COMPLETE CBC W/AUTO DIFF WBC: CPT

## 2020-05-09 PROCEDURE — 25000003 PHARM REV CODE 250: Performed by: STUDENT IN AN ORGANIZED HEALTH CARE EDUCATION/TRAINING PROGRAM

## 2020-05-09 PROCEDURE — 27201423 OPTIME MED/SURG SUP & DEVICES STERILE SUPPLY: Performed by: SURGERY

## 2020-05-09 PROCEDURE — 71000039 HC RECOVERY, EACH ADD'L HOUR: Performed by: SURGERY

## 2020-05-09 PROCEDURE — G0378 HOSPITAL OBSERVATION PER HR: HCPCS

## 2020-05-09 PROCEDURE — 37000008 HC ANESTHESIA 1ST 15 MINUTES: Performed by: SURGERY

## 2020-05-09 RX ORDER — ONDANSETRON 2 MG/ML
INJECTION INTRAMUSCULAR; INTRAVENOUS
Status: DISCONTINUED | OUTPATIENT
Start: 2020-05-09 | End: 2020-05-09

## 2020-05-09 RX ORDER — OXYCODONE AND ACETAMINOPHEN 5; 325 MG/1; MG/1
1 TABLET ORAL
Status: DISCONTINUED | OUTPATIENT
Start: 2020-05-09 | End: 2020-05-09

## 2020-05-09 RX ORDER — LIDOCAINE HCL/PF 100 MG/5ML
SYRINGE (ML) INTRAVENOUS
Status: DISCONTINUED | OUTPATIENT
Start: 2020-05-09 | End: 2020-05-09

## 2020-05-09 RX ORDER — ACETAMINOPHEN 500 MG
1000 TABLET ORAL EVERY 8 HOURS
Status: DISCONTINUED | OUTPATIENT
Start: 2020-05-09 | End: 2020-05-10 | Stop reason: HOSPADM

## 2020-05-09 RX ORDER — ACETAMINOPHEN 10 MG/ML
INJECTION, SOLUTION INTRAVENOUS
Status: DISCONTINUED | OUTPATIENT
Start: 2020-05-09 | End: 2020-05-09

## 2020-05-09 RX ORDER — ENOXAPARIN SODIUM 100 MG/ML
40 INJECTION SUBCUTANEOUS EVERY 24 HOURS
Status: DISCONTINUED | OUTPATIENT
Start: 2020-05-09 | End: 2020-05-10 | Stop reason: HOSPADM

## 2020-05-09 RX ORDER — HYDROMORPHONE HYDROCHLORIDE 2 MG/ML
0.5 INJECTION, SOLUTION INTRAMUSCULAR; INTRAVENOUS; SUBCUTANEOUS EVERY 5 MIN PRN
Status: COMPLETED | OUTPATIENT
Start: 2020-05-09 | End: 2020-05-09

## 2020-05-09 RX ORDER — EPHEDRINE SULFATE 50 MG/ML
INJECTION, SOLUTION INTRAVENOUS
Status: DISCONTINUED | OUTPATIENT
Start: 2020-05-09 | End: 2020-05-09

## 2020-05-09 RX ORDER — PHENYLEPHRINE HYDROCHLORIDE 10 MG/ML
INJECTION INTRAVENOUS
Status: DISCONTINUED | OUTPATIENT
Start: 2020-05-09 | End: 2020-05-09

## 2020-05-09 RX ORDER — SODIUM CHLORIDE 0.9 % (FLUSH) 0.9 %
10 SYRINGE (ML) INJECTION
Status: DISCONTINUED | OUTPATIENT
Start: 2020-05-09 | End: 2020-05-09

## 2020-05-09 RX ORDER — PROPOFOL 10 MG/ML
INJECTION, EMULSION INTRAVENOUS
Status: DISCONTINUED | OUTPATIENT
Start: 2020-05-09 | End: 2020-05-09

## 2020-05-09 RX ORDER — SODIUM CHLORIDE, SODIUM LACTATE, POTASSIUM CHLORIDE, CALCIUM CHLORIDE 600; 310; 30; 20 MG/100ML; MG/100ML; MG/100ML; MG/100ML
INJECTION, SOLUTION INTRAVENOUS CONTINUOUS PRN
Status: DISCONTINUED | OUTPATIENT
Start: 2020-05-09 | End: 2020-05-09

## 2020-05-09 RX ORDER — ROCURONIUM BROMIDE 10 MG/ML
INJECTION, SOLUTION INTRAVENOUS
Status: DISCONTINUED | OUTPATIENT
Start: 2020-05-09 | End: 2020-05-09

## 2020-05-09 RX ORDER — SUCCINYLCHOLINE CHLORIDE 20 MG/ML
INJECTION INTRAMUSCULAR; INTRAVENOUS
Status: DISCONTINUED | OUTPATIENT
Start: 2020-05-09 | End: 2020-05-09

## 2020-05-09 RX ORDER — GLYCOPYRROLATE 0.2 MG/ML
INJECTION INTRAMUSCULAR; INTRAVENOUS
Status: DISCONTINUED | OUTPATIENT
Start: 2020-05-09 | End: 2020-05-09

## 2020-05-09 RX ORDER — MIDAZOLAM HYDROCHLORIDE 1 MG/ML
INJECTION, SOLUTION INTRAMUSCULAR; INTRAVENOUS
Status: DISCONTINUED | OUTPATIENT
Start: 2020-05-09 | End: 2020-05-09

## 2020-05-09 RX ORDER — BUPIVACAINE HYDROCHLORIDE 5 MG/ML
INJECTION, SOLUTION PERINEURAL
Status: DISCONTINUED | OUTPATIENT
Start: 2020-05-09 | End: 2020-05-09 | Stop reason: HOSPADM

## 2020-05-09 RX ORDER — NEOSTIGMINE METHYLSULFATE 1 MG/ML
INJECTION, SOLUTION INTRAVENOUS
Status: DISCONTINUED | OUTPATIENT
Start: 2020-05-09 | End: 2020-05-09

## 2020-05-09 RX ORDER — OXYCODONE HYDROCHLORIDE 5 MG/1
5 TABLET ORAL EVERY 4 HOURS PRN
Status: DISCONTINUED | OUTPATIENT
Start: 2020-05-09 | End: 2020-05-10 | Stop reason: HOSPADM

## 2020-05-09 RX ORDER — KETOROLAC TROMETHAMINE 10 MG/1
10 TABLET, FILM COATED ORAL EVERY 8 HOURS
Status: DISCONTINUED | OUTPATIENT
Start: 2020-05-09 | End: 2020-05-10 | Stop reason: HOSPADM

## 2020-05-09 RX ORDER — DEXAMETHASONE SODIUM PHOSPHATE 4 MG/ML
INJECTION, SOLUTION INTRA-ARTICULAR; INTRALESIONAL; INTRAMUSCULAR; INTRAVENOUS; SOFT TISSUE
Status: DISCONTINUED | OUTPATIENT
Start: 2020-05-09 | End: 2020-05-09

## 2020-05-09 RX ORDER — FENTANYL CITRATE 50 UG/ML
INJECTION, SOLUTION INTRAMUSCULAR; INTRAVENOUS
Status: DISCONTINUED | OUTPATIENT
Start: 2020-05-09 | End: 2020-05-09

## 2020-05-09 RX ORDER — ONDANSETRON 2 MG/ML
4 INJECTION INTRAMUSCULAR; INTRAVENOUS DAILY PRN
Status: DISCONTINUED | OUTPATIENT
Start: 2020-05-09 | End: 2020-05-09

## 2020-05-09 RX ADMIN — PHENYLEPHRINE HYDROCHLORIDE 50 MCG: 10 INJECTION INTRAVENOUS at 07:05

## 2020-05-09 RX ADMIN — ROCURONIUM BROMIDE 10 MG: 10 INJECTION, SOLUTION INTRAVENOUS at 08:05

## 2020-05-09 RX ADMIN — SUCCINYLCHOLINE CHLORIDE 100 MG: 20 INJECTION, SOLUTION INTRAMUSCULAR; INTRAVENOUS at 07:05

## 2020-05-09 RX ADMIN — CEFOXITIN SODIUM 2 G: 2 INJECTION, SOLUTION INTRAVENOUS at 06:05

## 2020-05-09 RX ADMIN — SODIUM CHLORIDE, SODIUM LACTATE, POTASSIUM CHLORIDE, AND CALCIUM CHLORIDE: .6; .31; .03; .02 INJECTION, SOLUTION INTRAVENOUS at 08:05

## 2020-05-09 RX ADMIN — ROCURONIUM BROMIDE 20 MG: 10 INJECTION, SOLUTION INTRAVENOUS at 07:05

## 2020-05-09 RX ADMIN — HYDROMORPHONE HYDROCHLORIDE 0.5 MG: 2 INJECTION, SOLUTION INTRAMUSCULAR; INTRAVENOUS; SUBCUTANEOUS at 10:05

## 2020-05-09 RX ADMIN — ACETAMINOPHEN 1000 MG: 500 TABLET ORAL at 10:05

## 2020-05-09 RX ADMIN — ONDANSETRON 4 MG: 2 INJECTION INTRAMUSCULAR; INTRAVENOUS at 10:05

## 2020-05-09 RX ADMIN — LIDOCAINE HYDROCHLORIDE 70 MG: 20 INJECTION, SOLUTION INTRAVENOUS at 07:05

## 2020-05-09 RX ADMIN — MIDAZOLAM 2 MG: 1 INJECTION INTRAMUSCULAR; INTRAVENOUS at 07:05

## 2020-05-09 RX ADMIN — ACETAMINOPHEN 1000 MG: 500 TABLET ORAL at 02:05

## 2020-05-09 RX ADMIN — GLYCOPYRROLATE 0.6 MG: 0.2 INJECTION, SOLUTION INTRAMUSCULAR; INTRAVENOUS at 08:05

## 2020-05-09 RX ADMIN — HYDROMORPHONE HYDROCHLORIDE 0.5 MG: 2 INJECTION, SOLUTION INTRAMUSCULAR; INTRAVENOUS; SUBCUTANEOUS at 09:05

## 2020-05-09 RX ADMIN — KETOROLAC TROMETHAMINE 30 MG: 30 INJECTION, SOLUTION INTRAMUSCULAR at 09:05

## 2020-05-09 RX ADMIN — PHENYLEPHRINE HYDROCHLORIDE 50 MCG: 10 INJECTION INTRAVENOUS at 08:05

## 2020-05-09 RX ADMIN — FENTANYL CITRATE 50 MCG: 50 INJECTION, SOLUTION INTRAMUSCULAR; INTRAVENOUS at 09:05

## 2020-05-09 RX ADMIN — ACETAMINOPHEN 1000 MG: 10 INJECTION, SOLUTION INTRAVENOUS at 07:05

## 2020-05-09 RX ADMIN — PANTOPRAZOLE SODIUM 40 MG: 40 TABLET, DELAYED RELEASE ORAL at 02:05

## 2020-05-09 RX ADMIN — OXYCODONE HYDROCHLORIDE 5 MG: 5 TABLET ORAL at 02:05

## 2020-05-09 RX ADMIN — PROMETHAZINE HYDROCHLORIDE 6.25 MG: 25 INJECTION INTRAMUSCULAR; INTRAVENOUS at 01:05

## 2020-05-09 RX ADMIN — KETOROLAC TROMETHAMINE 10 MG: 10 TABLET, FILM COATED ORAL at 10:05

## 2020-05-09 RX ADMIN — ENOXAPARIN SODIUM 40 MG: 100 INJECTION SUBCUTANEOUS at 05:05

## 2020-05-09 RX ADMIN — FENTANYL CITRATE 100 MCG: 50 INJECTION, SOLUTION INTRAMUSCULAR; INTRAVENOUS at 07:05

## 2020-05-09 RX ADMIN — EPHEDRINE SULFATE 10 MG: 50 INJECTION, SOLUTION INTRAMUSCULAR; INTRAVENOUS; SUBCUTANEOUS at 08:05

## 2020-05-09 RX ADMIN — SODIUM CHLORIDE, SODIUM LACTATE, POTASSIUM CHLORIDE, AND CALCIUM CHLORIDE: .6; .31; .03; .02 INJECTION, SOLUTION INTRAVENOUS at 07:05

## 2020-05-09 RX ADMIN — DEXAMETHASONE SODIUM PHOSPHATE 4 MG: 4 INJECTION, SOLUTION INTRA-ARTICULAR; INTRALESIONAL; INTRAMUSCULAR; INTRAVENOUS; SOFT TISSUE at 07:05

## 2020-05-09 RX ADMIN — NEOSTIGMINE METHYLSULFATE 3.5 MG: 1 INJECTION INTRAVENOUS at 08:05

## 2020-05-09 RX ADMIN — ROCURONIUM BROMIDE 5 MG: 10 INJECTION, SOLUTION INTRAVENOUS at 07:05

## 2020-05-09 RX ADMIN — OXYCODONE HYDROCHLORIDE 5 MG: 5 TABLET ORAL at 06:05

## 2020-05-09 RX ADMIN — KETOROLAC TROMETHAMINE 10 MG: 10 TABLET, FILM COATED ORAL at 05:05

## 2020-05-09 RX ADMIN — PROPOFOL 140 MG: 10 INJECTION, EMULSION INTRAVENOUS at 07:05

## 2020-05-09 RX ADMIN — ONDANSETRON 4 MG: 2 INJECTION, SOLUTION INTRAMUSCULAR; INTRAVENOUS at 09:05

## 2020-05-09 NOTE — OP NOTE
DATE OF PROCEDURE: 05/09/2020    PREOPERATIVE DIAGNOSIS:  Choledocholithiasis    POSTOPERATIVE DIAGNOSIS:  Acute cholecystitis with choledocholithiasis    PROCEDURE:  Robotic assisted cholecystectomy    SURGEON: Mayo Johnson M.D    ASSISTANT:  None    ANESTHESIA: General endotracheal    ESTIMATED BLOOD LOSS:  20 cc    SPECIMEN: Gallbladder    CONDITION: Stable    COMPLICATIONS: None    FINDINGS:   1.  Laparoscopic lysis of adhesions required to allow appropriate port placement  2.  Gallbladder found to be acutely inflamed and tense with omentum encasing the entire gallbladder  3.  Critical view of safety achieved  4.  Bile spillage occurred from perforation at the fundus during retraction    INDICATIONS: The patient is a 59 y.o. female admitted for suspected choledocholithiasis.  Underwent EUS at which time no obstruction was noted in the patient's LFTs improved.  Counseled by surgery on need for interval cholecystectomy and agreed to proceed with procedure while admitted. The risks of the procedure were described to the patient including pain, infection, bleeding, scarring, wound complications, injury to local structures such as bile duct, liver or intestine, warranting more extensive surgery, retained common bile duct stone or need for further intervention. The patient demonstrated understanding of these risks and a consent form was obtained.    PROCEDURE IN DETAIL: Patient was identified in the preoperative unit and taken back to the operating room and laid in the supine position on the operating table.  IV antibiotics were recently administered on the floor. General anesthesia was induced without complications.  Bilateral arms were tucked and appropriately padded.  The patient was then prepped and draped in typical sterile fashion. A time-out was performed in accordance with hospital protocol.  A Veress needle was inserted at [place] point in the peritoneal cavity.  This was attached insufflation at which  time we had appropriate initial pressures and pneumoperitoneum was achieved. An 8 mm Optiview trocar was introduced in the right periumbilical location under direct visualization. Our port insertion site and Veress needle insertion site were examined for injury and none was identified.  Patient had multiple previous abdominal surgeries including laparoscopic sleeve gastrectomy and a right lower quadrant incisional hernia repair.  She is found to have dense adhesions to the hernia repair in the right lower quadrant.  Under direct visualization we placed a left upper quadrant 8 mm trocar after injecting local anesthetic.  We then laparoscopically took down omental adhesions to the anterior abdominal wall to allow placement of our 2 additional 8 mm trocars in the right mid and right lateral abdomen. The robot was then docked. The gallbladder was identified and found to be completely encased in omentum with acute inflammatory changes.  The gallbladder was dissected free from the overlying omentum.  It was found to be tense and acutely inflamed.  The gallbladder fundus was grasped and retracted into the right upper quadrant and the infundibulum retracted laterally. The peritoneum over the infundibulum and cystic structures was then gently stripped until the cystic duct and artery were identified and the critical view of safety was achieved.The cystic duct and artery were doubly clipped proximally and once distally and then divided. The gallbladder was then dissected off the gallbladder fossa using electrocautery. Once this was completed, it was placed into an EndoCatch bag.  during the dissection there was a perforation in the fundus of the gallbladder which called bile spillage.  There were no obvious stones which spilled which is consistent with the preoperative ultrasound which showed only sludge.  The robot was undocked we perform laparoscopic suction and irrigation of the right upper quadrant.   The dissection field  was inspected for hemostasis, bile leak and to confirm clips were in place.  The gallbladder was removed from the left periumbilical incision site.   The extended port site was then closed with a 0 Vicryl fascial stitch.  The 2 right-sided ports were closed with 0 Prolene sutures since they traversed mesh.   The ports were removed and the abdomen desulfated.  Additional local anesthetic was injected and all the skin incisions were closed using 4-0 Vicryl subcuticular stitch. Dermabond was then applied. The patient was awakened from general anesthesia without complication and returned to the Postoperative Recovery Unit in stable condition. At the end of the case, sponge, instrument and needle counts were correct on 2 occasions. I was present and scrubbed throughout the entirety of the case.

## 2020-05-09 NOTE — PROGRESS NOTES
[facility]  PROGRESS NOTE    HPI: Alexia Zaragoza is a 59 y.o. female admitted for choledocholithiasis. Underwent EUS which failed to show obstruction indicating stone/sludge passes spontaneously.     INTERVAL HISTORY: NAEON. Tolerated small amount of PO without abdominal pain.Denies f/c, n/v.     VITALS:  Temp:  [96.4 °F (35.8 °C)-98.2 °F (36.8 °C)] 97.7 °F (36.5 °C)  Pulse:  [54-71] 61  Resp:  [14-22] 20  SpO2:  [96 %-98 %] 97 %  BP: (101-139)/(51-74) 122/61    I&Os:  I/O last 3 completed shifts:  In: 100 [I.V.:100]  Out: 650 [Urine:650]    PHYSICAL EXAM:  GEN: NAD, AAOx3  HEENT:Anicteric sclera  CV:RRR  RESP:NLB  ABD:Soft, NT, ND  EXT:No edema    LABS:  CBC:   Recent Labs   Lab 05/09/20  0501   WBC 5.32   RBC 4.17   HGB 12.3   HCT 39.4      MCV 95   MCH 29.5   MCHC 31.2*     BMP:   Recent Labs   Lab 05/09/20  0501   GLU 81      K 3.7      CO2 22*   BUN 21*   CREATININE 0.8   CALCIUM 9.2     Labs within the past 24 hours have been reviewed.      ASSESSMENT & PLAN:  59 y.o. female     Choledocholithiasis  - initial symptoms and lab work indicate choledocholithiasis  - EUS failed to show common bile duct dilatation or intraductal abnormality  - stone/sludge has likely passed  - repeat labs show improvement in Tbili and LFTs  - have recommended cholecystectomy to remove source of stones/sludge and prevent future recurrences  - plan for laparoscopic cholecystectomy today  - NPO abdomen  - cefoxitin on call to OR

## 2020-05-09 NOTE — PLAN OF CARE
Pt aaox4. Pt c/o constant headache. No N/V at this time. Pt went for a scope today. Plans for a lap christie tomorrow morning. Up ad paty.

## 2020-05-09 NOTE — TRANSFER OF CARE
"Anesthesia Transfer of Care Note    Patient: Alexia Zaragoza    Procedure(s) Performed: Procedure(s) (LRB):  ROBOTIC CHOLECYSTECTOMY (N/A)    Patient location: PACU    Anesthesia Type: general    Transport from OR: Transported from OR on 2-3 L/min O2 by NC with adequate spontaneous ventilation    Post pain: adequate analgesia    Post assessment: no apparent anesthetic complications    Post vital signs: stable    Level of consciousness: awake, alert and oriented    Nausea/Vomiting: no nausea/vomiting    Complications: none    Transfer of care protocol was followed      Last vitals:   Visit Vitals  /65 (BP Location: Left arm, Patient Position: Lying)   Pulse (!) 59   Temp 36.8 °C (98.3 °F) (Skin)   Resp 19   Ht 5' 7" (1.702 m)   Wt 72.4 kg (159 lb 9.8 oz)   SpO2 100%   Breastfeeding? No   BMI 25.00 kg/m²     "

## 2020-05-09 NOTE — PLAN OF CARE
AAO,NPO MN for OR,meds admin per mar,toradol admin for c/o headaches moderate relief,safety maintained.

## 2020-05-09 NOTE — PROGRESS NOTES
General Surgery    Status post robotic cholecystectomy at which time acute cholecystitis was identified.  Procedure overall uneventful except for spillage of bile which was suction.    - return to the floor after recovery  - clear liquid diet ordered, advance to regular as tolerated  - okay for discharge per General surgery  - on discharge recommend scheduled Tylenol 1000 mg q.8 hours alternating with ibuprofen 600 mg every 8 hr for the next 3-5 days, oxycodone 5 mg x 20 tabs every 4-6 hours p.r.n. for breakthrough pain  - postsurgical instructions entered in discharge orders  - will arrange for follow-up in General surgery Clinic in 2 weeks  - please call with any questions or concerns    Mayo Johnson Jr  [phone]

## 2020-05-09 NOTE — PROGRESS NOTES
Progress Note    Admit Date: 5/7/2020   LOS: 0 days     SUBJECTIVE:     NAEON. Patient was NPO at midnight for OR this morning for cholecystectomy. Patient was already in the OR prior to being evaluated.     Denies f/c/n/v/d/c/cp/palpitations/freq/urgency/dysuria    Continuous Infusions:  Scheduled Meds:   pantoprazole  40 mg Oral Daily     PRN Meds:acetaminophen, bupivacaine, Dextrose 10% Bolus, Dextrose 10% Bolus, glucagon (human recombinant), glucose, glucose, ketorolac, melatonin, ondansetron, promethazine (PHENERGAN) IVPB, sodium chloride 0.9%    Review of patient's allergies indicates:  No Known Allergies    OBJECTIVE:     Vital Signs (Most Recent)  Temp: 97.7 °F (36.5 °C) (05/09/20 0416)  Pulse: 61 (05/09/20 0416)  Resp: 20 (05/09/20 0416)  BP: 122/61 (05/09/20 0416)  SpO2: 97 % (05/09/20 0416)    Vital Signs Range (Last 24H):  Temp:  [96.6 °F (35.9 °C)-98.2 °F (36.8 °C)]   Pulse:  [54-71]   Resp:  [14-22]   BP: (101-139)/(51-74)   SpO2:  [96 %-98 %]     I & O (Last 24H):    Intake/Output Summary (Last 24 hours) at 5/9/2020 0851  Last data filed at 5/9/2020 0632  Gross per 24 hour   Intake 250 ml   Output 500 ml   Net -250 ml       Physical Exam:    GEN - AAOx4, NAD  Oral - MMM; no exudate  CHEST - CTAB ;equal expansion  HEART - rrr; no m/r/s3/s4  ABD - soft; mild ttp of the RUQ   EXTR - warm; no edema  NEURO - no asterixis or focal deficits   SKIN - no obvious skin breakdown or rash    Lines/Drains:       Peripheral IV - Single Lumen 05/07/20 1149 20 G Right Forearm (Active)   Site Assessment Clean;Dry;Intact;No redness;No swelling 5/8/2020  4:00 AM   Line Status Flushed;Saline locked 5/7/2020  8:54 PM   Dressing Status Clean;Dry;Intact 5/7/2020  8:54 PM   Dressing Intervention First dressing 5/7/2020  8:54 PM   Reason Not Rotated Not due 5/7/2020  8:54 PM   Number of days: 0       Recent Labs     05/07/20  1149 05/07/20  2238 05/08/20  0545 05/09/20  0501   WBC 9.80  --  6.77 5.32   HGB 12.3  --  12.0 12.3    HCT 36.4*  --  37.1 39.4     --  205 208     --  140 140   K 3.9  --  3.6 3.7     --  104 107   CO2 26  --  28 22*   BUN 16  --  13 21*   CREATININE 0.7  --  0.8 0.8   BILITOT 1.6*  --  2.4* 1.5*   *  --  291* 147*   *  --  278* 220*   ALKPHOS 80  --  123 173*   CALCIUM 9.1  --  9.2 9.2   ALBUMIN 4.1  --  3.5 3.6   PROT 7.3  --  6.8 7.1   MG  --  2.2 2.3 2.2   PHOS  --  3.3 3.7 3.8         ASSESSMENT/PLAN:     Alexia Zaragoza is a 59 y.o. female with pmh  has a past medical history of Cluster headaches, Essential hypertension, benign (7/2/2019), History of colon polyps, History of right hemicolectomy (7/2/2019), and Mixed hyperlipidemia (7/10/2019).   who presented with Choledocholithiasis. The primary encounter diagnosis was Choledocholithiasis. Diagnoses of Mixed hyperlipidemia and Elevated LFTs were also pertinent to this visit.    Choledocholithiasis:   - Afebrile, VSS  - TBili 1.6, ,   - Abdominal US completed at OSH showed sludge in the dependent portion of the gallbladder and dilation of the CBD (1 cm greatest in diameter) with suggestion for ERCP vs MRCP to evaluate for a distal common bile duct obstruction  - Case discussed with Dr. Urrutia ([facility]), who recommended transfer to [facility] with plan for ERCP in the morning  - Tylenol and Toradol PRN for pain  - Zofran and Phenergan PRN for nausea   - MRCP was normal  - NPO at midnight for cholecystectomy  - Pain management   - Continue to monitor     HTN:   - /57 on admit, not currently taking any antihypertensive medications at home  - Stable, will continue to monitor     HLD:   - hold statin in setting of elevated liver ezymes      Code: Full  PPx: SCDs, PPI  Diet: NPO     Dispo: Pending cholecystectomy       Margo Arauz, PGY-2  [facility]  05/09/2020 8:56 AM

## 2020-05-09 NOTE — PLAN OF CARE
Patient has met PACU discharge criteria, VSS, pain well controlled. Family updated by phone. Released from PACU by Dr. Hill

## 2020-05-09 NOTE — ANESTHESIA POSTPROCEDURE EVALUATION
Anesthesia Post Evaluation    Patient: Alexia Zaragoza    Procedure(s) Performed: Procedure(s) (LRB):  ROBOTIC CHOLECYSTECTOMY (N/A)    Final Anesthesia Type: general    Patient location during evaluation: PACU  Patient participation: Yes- Able to Participate  Level of consciousness: awake and alert  Post-procedure vital signs: reviewed and stable  Pain management: adequate  Airway patency: patent    PONV status at discharge: No PONV  Anesthetic complications: no      Cardiovascular status: blood pressure returned to baseline  Respiratory status: unassisted  Hydration status: euvolemic  Follow-up not needed.          Vitals Value Taken Time   /60 5/9/2020 10:15 AM   Temp 36.6 °C (97.8 °F) 5/9/2020 10:10 AM   Pulse 64 5/9/2020 10:15 AM   Resp 17 5/9/2020 10:15 AM   SpO2 98 % 5/9/2020 10:15 AM         Event Time     Out of Recovery 05/09/2020 10:28:47          Pain/Mary Ellen Score: Pain Rating Prior to Med Admin: 7 (5/9/2020 10:13 AM)  Pain Rating Post Med Admin: 4 (5/9/2020 10:13 AM)  Mary Ellen Score: 9 (5/9/2020 10:25 AM)

## 2020-05-09 NOTE — PLAN OF CARE
VN cued into room.  Pt resting comfortably in bed with call light and personal belongings within reach.  NADN.  No family at bedside.  Pt groggy post lap christie, complaining of nausea.  Zofran given.  No other needs or complaints at this time.  Reminded pt to use call light as needed.  VN will continue to follow and be available as needed.

## 2020-05-10 VITALS
RESPIRATION RATE: 20 BRPM | SYSTOLIC BLOOD PRESSURE: 104 MMHG | HEART RATE: 63 BPM | WEIGHT: 166.25 LBS | OXYGEN SATURATION: 94 % | TEMPERATURE: 99 F | DIASTOLIC BLOOD PRESSURE: 57 MMHG | HEIGHT: 67 IN | BODY MASS INDEX: 26.09 KG/M2

## 2020-05-10 LAB
ALBUMIN SERPL BCP-MCNC: 3.2 G/DL (ref 3.5–5.2)
ALP SERPL-CCNC: 156 U/L (ref 55–135)
ALT SERPL W/O P-5'-P-CCNC: 146 U/L (ref 10–44)
ANION GAP SERPL CALC-SCNC: 10 MMOL/L (ref 8–16)
AST SERPL-CCNC: 89 U/L (ref 10–40)
BASOPHILS # BLD AUTO: 0.01 K/UL (ref 0–0.2)
BASOPHILS NFR BLD: 0.1 % (ref 0–1.9)
BILIRUB SERPL-MCNC: 1 MG/DL (ref 0.1–1)
BUN SERPL-MCNC: 14 MG/DL (ref 6–20)
CALCIUM SERPL-MCNC: 8.9 MG/DL (ref 8.7–10.5)
CHLORIDE SERPL-SCNC: 104 MMOL/L (ref 95–110)
CO2 SERPL-SCNC: 26 MMOL/L (ref 23–29)
CREAT SERPL-MCNC: 0.8 MG/DL (ref 0.5–1.4)
DIFFERENTIAL METHOD: ABNORMAL
EOSINOPHIL # BLD AUTO: 0.1 K/UL (ref 0–0.5)
EOSINOPHIL NFR BLD: 0.6 % (ref 0–8)
ERYTHROCYTE [DISTWIDTH] IN BLOOD BY AUTOMATED COUNT: 12.5 % (ref 11.5–14.5)
EST. GFR  (AFRICAN AMERICAN): >60 ML/MIN/1.73 M^2
EST. GFR  (NON AFRICAN AMERICAN): >60 ML/MIN/1.73 M^2
GLUCOSE SERPL-MCNC: 86 MG/DL (ref 70–110)
HCT VFR BLD AUTO: 35.3 % (ref 37–48.5)
HGB BLD-MCNC: 11.4 G/DL (ref 12–16)
IMM GRANULOCYTES # BLD AUTO: 0.03 K/UL (ref 0–0.04)
IMM GRANULOCYTES NFR BLD AUTO: 0.4 % (ref 0–0.5)
LYMPHOCYTES # BLD AUTO: 1.1 K/UL (ref 1–4.8)
LYMPHOCYTES NFR BLD: 13 % (ref 18–48)
MAGNESIUM SERPL-MCNC: 2.2 MG/DL (ref 1.6–2.6)
MCH RBC QN AUTO: 29.8 PG (ref 27–31)
MCHC RBC AUTO-ENTMCNC: 32.3 G/DL (ref 32–36)
MCV RBC AUTO: 92 FL (ref 82–98)
MONOCYTES # BLD AUTO: 0.7 K/UL (ref 0.3–1)
MONOCYTES NFR BLD: 8.5 % (ref 4–15)
NEUTROPHILS # BLD AUTO: 6.6 K/UL (ref 1.8–7.7)
NEUTROPHILS NFR BLD: 77.4 % (ref 38–73)
NRBC BLD-RTO: 0 /100 WBC
PHOSPHATE SERPL-MCNC: 3.9 MG/DL (ref 2.7–4.5)
PLATELET # BLD AUTO: 199 K/UL (ref 150–350)
PMV BLD AUTO: 9.4 FL (ref 9.2–12.9)
POTASSIUM SERPL-SCNC: 3.9 MMOL/L (ref 3.5–5.1)
PROT SERPL-MCNC: 6.7 G/DL (ref 6–8.4)
RBC # BLD AUTO: 3.82 M/UL (ref 4–5.4)
SODIUM SERPL-SCNC: 140 MMOL/L (ref 136–145)
WBC # BLD AUTO: 8.49 K/UL (ref 3.9–12.7)

## 2020-05-10 PROCEDURE — G0378 HOSPITAL OBSERVATION PER HR: HCPCS

## 2020-05-10 PROCEDURE — 84100 ASSAY OF PHOSPHORUS: CPT

## 2020-05-10 PROCEDURE — 83735 ASSAY OF MAGNESIUM: CPT

## 2020-05-10 PROCEDURE — 25000003 PHARM REV CODE 250: Performed by: SURGERY

## 2020-05-10 PROCEDURE — 99214 OFFICE O/P EST MOD 30 MIN: CPT | Mod: ,,, | Performed by: INTERNAL MEDICINE

## 2020-05-10 PROCEDURE — 85025 COMPLETE CBC W/AUTO DIFF WBC: CPT

## 2020-05-10 PROCEDURE — 99214 PR OFFICE/OUTPT VISIT, EST, LEVL IV, 30-39 MIN: ICD-10-PCS | Mod: ,,, | Performed by: INTERNAL MEDICINE

## 2020-05-10 PROCEDURE — 36415 COLL VENOUS BLD VENIPUNCTURE: CPT

## 2020-05-10 PROCEDURE — 80053 COMPREHEN METABOLIC PANEL: CPT

## 2020-05-10 RX ORDER — OXYCODONE HYDROCHLORIDE 5 MG/1
5 TABLET ORAL EVERY 4 HOURS PRN
Qty: 20 TABLET | Refills: 0 | Status: SHIPPED | OUTPATIENT
Start: 2020-05-10 | End: 2020-06-01

## 2020-05-10 RX ORDER — OXYCODONE HYDROCHLORIDE 5 MG/1
5 TABLET ORAL EVERY 4 HOURS PRN
Qty: 20 TABLET | Refills: 0 | Status: SHIPPED | OUTPATIENT
Start: 2020-05-10 | End: 2020-05-10

## 2020-05-10 RX ADMIN — KETOROLAC TROMETHAMINE 10 MG: 10 TABLET, FILM COATED ORAL at 06:05

## 2020-05-10 RX ADMIN — OXYCODONE HYDROCHLORIDE 5 MG: 5 TABLET ORAL at 08:05

## 2020-05-10 RX ADMIN — OXYCODONE HYDROCHLORIDE 5 MG: 5 TABLET ORAL at 01:05

## 2020-05-10 RX ADMIN — PANTOPRAZOLE SODIUM 40 MG: 40 TABLET, DELAYED RELEASE ORAL at 08:05

## 2020-05-10 RX ADMIN — ACETAMINOPHEN 1000 MG: 500 TABLET ORAL at 06:05

## 2020-05-10 NOTE — PROGRESS NOTES
[facility]  Gastroenterology  Progress Note    Patient Name: Alexia Zaragoza  MRN: [id number]  Admission Date: 5/7/2020  Hospital Length of Stay: 0 days  Code Status: Full Code   Attending Provider: Kwaku Bauer MD  Consulting Provider: Jojo Tam MD  Primary Care Physician: Hugo Franco MD  Principal Problem: Choledocholithiasis      Subjective:     Interval History: Doing well, afebrile. Abd pain better.     The following portions of the patient's history were reviewed and updated as appropriate: allergies, current medications, past family history, past medical history, past social history, past surgical history and problem list.      Review of Systems   Constitutional: Negative for diaphoresis and unexpected weight change.   Respiratory: Negative for shortness of breath and wheezing.    Cardiovascular: Negative for chest pain and palpitations.   Gastrointestinal: Negative for blood in stool and nausea.     Objective:     Vital Signs (Most Recent):  Temp: 98.7 °F (37.1 °C) (05/10/20 0751)  Pulse: 62 (05/10/20 0751)  Resp: 20 (05/10/20 0751)  BP: (!) 110/59 (05/10/20 0751)  SpO2: (!) 94 % (05/10/20 0436) Vital Signs (24h Range):  Temp:  [97.3 °F (36.3 °C)-98.7 °F (37.1 °C)] 98.7 °F (37.1 °C)  Pulse:  [57-69] 62  Resp:  [18-20] 20  SpO2:  [94 %-99 %] 94 %  BP: (110-146)/(54-70) 110/59     Weight: 75.4 kg (166 lb 3.6 oz) (05/10/20 0436)  Body mass index is 26.03 kg/m².      Intake/Output Summary (Last 24 hours) at 5/10/2020 1034  Last data filed at 5/10/2020 0600  Gross per 24 hour   Intake 465 ml   Output 2400 ml   Net -1935 ml       Lines/Drains/Airways     Peripheral Intravenous Line                 Peripheral IV - Single Lumen 05/07/20 1149 20 G Right Forearm 2 days         Peripheral IV - Single Lumen 05/09/20 1030 20 G Right Hand 1 day                Physical Exam   Constitutional: She is oriented to person, place, and time. She appears well-developed and well-nourished. No  distress.   HENT:   Head: Normocephalic and atraumatic.   Eyes: Conjunctivae are normal. No scleral icterus.   Neck: Normal range of motion. Neck supple. No tracheal deviation present. No thyromegaly present.   Cardiovascular: Normal rate and regular rhythm. Exam reveals no gallop and no friction rub.   No murmur heard.  Pulmonary/Chest: Effort normal and breath sounds normal. No respiratory distress. She has no wheezes.   Abdominal: Soft. Bowel sounds are normal. She exhibits no distension. There is no tenderness.   Musculoskeletal:        Right wrist: She exhibits normal range of motion and no tenderness.        Left wrist: She exhibits normal range of motion and no tenderness.   Lymphadenopathy:        Head (right side): No submental and no submandibular adenopathy present.        Head (left side): No submental and no submandibular adenopathy present.   Neurological: She is alert and oriented to person, place, and time.   Skin: Skin is warm and dry. No rash noted. She is not diaphoretic. No erythema.   Psychiatric: She has a normal mood and affect. Her behavior is normal.   Nursing note and vitals reviewed.      Significant Labs:  CMP:   Recent Labs   Lab 05/10/20  0539   GLU 86   CALCIUM 8.9   ALBUMIN 3.2*   PROT 6.7      K 3.9   CO2 26      BUN 14   CREATININE 0.8   ALKPHOS 156*   *   AST 89*   BILITOT 1.0         Significant Imaging:  Imaging results within the past 24 hours have been reviewed.    Assessment/Plan:     * Choledocholithiasis  - EUS negative, suspect passed stone  - s/p cholecystectomy  - no s/s of complications  - ok to d/c from GI standpoint  - LFTs improved, would monitor as outpt by PCP to ensure they normalize           Thank you for your consult. I will follow-up with patient. Please contact us if you have any additional questions.    Jojo Tam MD  Gastroenterology  [facility]

## 2020-05-10 NOTE — PLAN OF CARE
Patient assisted to bathroom as needed. Medicated for pain with scheduled and PRN medication. SCD's in place. Incisions remain well approximated open to air with derma bond. Remains free from falls, bed alarm in use.

## 2020-05-10 NOTE — SUBJECTIVE & OBJECTIVE
Subjective:     Interval History: Doing well, afebrile. Abd pain better.     The following portions of the patient's history were reviewed and updated as appropriate: allergies, current medications, past family history, past medical history, past social history, past surgical history and problem list.      Review of Systems   Constitutional: Negative for diaphoresis and unexpected weight change.   Respiratory: Negative for shortness of breath and wheezing.    Cardiovascular: Negative for chest pain and palpitations.   Gastrointestinal: Negative for blood in stool and nausea.     Objective:     Vital Signs (Most Recent):  Temp: 98.7 °F (37.1 °C) (05/10/20 0751)  Pulse: 62 (05/10/20 0751)  Resp: 20 (05/10/20 0751)  BP: (!) 110/59 (05/10/20 0751)  SpO2: (!) 94 % (05/10/20 0436) Vital Signs (24h Range):  Temp:  [97.3 °F (36.3 °C)-98.7 °F (37.1 °C)] 98.7 °F (37.1 °C)  Pulse:  [57-69] 62  Resp:  [18-20] 20  SpO2:  [94 %-99 %] 94 %  BP: (110-146)/(54-70) 110/59     Weight: 75.4 kg (166 lb 3.6 oz) (05/10/20 0436)  Body mass index is 26.03 kg/m².      Intake/Output Summary (Last 24 hours) at 5/10/2020 1034  Last data filed at 5/10/2020 0600  Gross per 24 hour   Intake 465 ml   Output 2400 ml   Net -1935 ml       Lines/Drains/Airways     Peripheral Intravenous Line                 Peripheral IV - Single Lumen 05/07/20 1149 20 G Right Forearm 2 days         Peripheral IV - Single Lumen 05/09/20 1030 20 G Right Hand 1 day                Physical Exam   Constitutional: She is oriented to person, place, and time. She appears well-developed and well-nourished. No distress.   HENT:   Head: Normocephalic and atraumatic.   Eyes: Conjunctivae are normal. No scleral icterus.   Neck: Normal range of motion. Neck supple. No tracheal deviation present. No thyromegaly present.   Cardiovascular: Normal rate and regular rhythm. Exam reveals no gallop and no friction rub.   No murmur heard.  Pulmonary/Chest: Effort normal and breath sounds  normal. No respiratory distress. She has no wheezes.   Abdominal: Soft. Bowel sounds are normal. She exhibits no distension. There is no tenderness.   Musculoskeletal:        Right wrist: She exhibits normal range of motion and no tenderness.        Left wrist: She exhibits normal range of motion and no tenderness.   Lymphadenopathy:        Head (right side): No submental and no submandibular adenopathy present.        Head (left side): No submental and no submandibular adenopathy present.   Neurological: She is alert and oriented to person, place, and time.   Skin: Skin is warm and dry. No rash noted. She is not diaphoretic. No erythema.   Psychiatric: She has a normal mood and affect. Her behavior is normal.   Nursing note and vitals reviewed.      Significant Labs:  CMP:   Recent Labs   Lab 05/10/20  0539   GLU 86   CALCIUM 8.9   ALBUMIN 3.2*   PROT 6.7      K 3.9   CO2 26      BUN 14   CREATININE 0.8   ALKPHOS 156*   *   AST 89*   BILITOT 1.0         Significant Imaging:  Imaging results within the past 24 hours have been reviewed.

## 2020-05-10 NOTE — PLAN OF CARE
VIRTUAL NURSE:  Cued into patient's room.  Permission received per patient to turn camera to view patient.  Introduced as VN for night shift that will be working with floor nurse and nursing assistant.  Educated patient on VN's role in patient care. Educated patient I use of IS and purpose of it to prevent pneumonia after surgery. Plan of care reviewed with patient. Education per flowsheet.  Opportunity given for questions and questions answered.  Instructed to call for assistance.  Will cont to monitor.    Labs, notes, and orders reviewed.

## 2020-05-10 NOTE — PLAN OF CARE
Discharge orders noted, no HH or HME ordered.    Future Appointments   Date Time Provider Department Center   7/1/2020  9:40 AM Mayo Johnson Jr., MD [facility]     Pt's nurse will go over medications/signs and symptoms prior to discharge       05/10/20 1028   Final Note   Assessment Type Final Discharge Note   Anticipated Discharge Disposition Home   What phone number can be called within the next 1-3 days to see how you are doing after discharge? [phone]   Hospital Follow Up  Appt(s) scheduled? No  (Offices closed for Weekend. Patient to schedule own follow up appointment.  )   Right Care Referral Info   Post Acute Recommendation No Care     Saira Bhandari, RN Transitional Navigator  [phone]

## 2020-05-10 NOTE — ASSESSMENT & PLAN NOTE
- EUS negative, suspect passed stone  - s/p cholecystectomy  - no s/s of complications  - ok to d/c from GI standpoint  - LFTs improved, would monitor as outpt by PCP to ensure they normalize

## 2020-05-10 NOTE — PROGRESS NOTES
[facility]  PROGRESS NOTE    HPI: Alexia Zaragoza is a 59 y.o. female admitted for choledocholithiasis    INTERVAL HISTORY:  Underwent robotic cholecystectomy yesterday.  Gallbladder found to be acutely flame.  Spillage of bile occurred but with suction.  Complains of soreness decreased appetite today but pain is relatively well controlled.  Denies any nausea or vomiting.    VITALS:  Temp:  [97.3 °F (36.3 °C)-98.7 °F (37.1 °C)] 98.7 °F (37.1 °C)  Pulse:  [57-69] 62  Resp:  [15-20] 20  SpO2:  [94 %-99 %] 94 %  BP: (110-146)/(54-70) 110/59    I&Os:  I/O last 3 completed shifts:  In: 1615 [P.O.:515; I.V.:1000; IV Piggyback:100]  Out: 2400 [Urine:2400]    PHYSICAL EXAM:  GEN:  Acute distress, alert orient x3  HEENT:  Anicteric sclera  CV:  Regular rate rhythm  RESP:  Nonlabored breathing  ABD:  Soft, appropriate tenderness      LABS:  CBC:   Recent Labs   Lab 05/10/20  0539   WBC 8.49   RBC 3.82*   HGB 11.4*   HCT 35.3*      MCV 92   MCH 29.8   MCHC 32.3     CMP:   Recent Labs   Lab 05/10/20  0539   GLU 86   CALCIUM 8.9   ALBUMIN 3.2*   PROT 6.7      K 3.9   CO2 26      BUN 14   CREATININE 0.8   ALKPHOS 156*   *   AST 89*   BILITOT 1.0     Labs within the past 24 hours have been reviewed.      ASSESSMENT & PLAN:  59 y.o. female     Status post robotic cholecystectomy  - diet as tolerated  - recommend scheduled Tylenol 1000 mg q.8 hours alternating with ibuprofen 600 mg every 8 hr for the next 3-5 days, oxycodone 5 mg x 20 tabs every 4-6 hours p.r.n. for breakthrough pain  - postsurgical instructions entered in discharge orders  - will arrange for follow-up in General surgery Clinic in 2 weeks  - please call with any questions or concerns     Mayo Johnson Jr  [phone]

## 2020-05-10 NOTE — DISCHARGE SUMMARY
Discharge Summary      Admit Date: 5/7/2020    Discharge Date and Time: 05/10/2020    Attending Physician: Kwaku Bauer MD     Discharge Physician: Kandi Medel    Principal Diagnoses: Choledocholithiasis  The primary encounter diagnosis was Choledocholithiasis with acute cholecystitis. Diagnoses of Choledocholithiasis, Mixed hyperlipidemia, and Elevated LFTs were also pertinent to this visit.    Discharged Condition: good    Hospital Course: Alexia Zaragoza is a 59 y.o. female with pmh  has a past medical history of Cluster headaches, Essential hypertension, benign (7/2/2019), History of colon polyps, History of right hemicolectomy (7/2/2019), and Mixed hyperlipidemia (7/10/2019). who presented with Choledocholithiasis. The following is the hospital course by system:  58 yo F with PMHx including cluster headaches, HLD, and HTN who presents to [facility] on 5/7 [place] treatment of choledocholithiasis with plan for GI consult and ERCP on 5/8AM. Pt reports RUQ abdominal pain starting 5 days ago and associated with nausea and NBNB vomiting; the pain progressed to epigastric abdominal pain radiating to the lower chest and back. Pt was initially seen at OSH ED early this morning and evaluated for PE: D-Dimer slightly elevated, CTA negative. Pain resolved with GI cocktail and she was discharged to home with plan for outpatient follow up for suspected GERD. Later this afternoon, pt returned to OSH ED after pain returned (more severe) after eating coffee and crackers at home. Further workup consistent with choledocholithiasis. Case discussed with Dr. Urrutia ([facility]) who recommended transfer to [facility] for ERCP tomorrow morning.     ERCP with GI was negative for obstructing stone, patient was anxious that pain would return. Therefore, general surgery was consulted for evaluation of inpatient cholecystectomy. Dr. Johnson with [facility] recommended inpatient  cholecystectomy. Patient went for procedure on 5/9/2020. She tolerated procedure well with no post-op complications. Her diet was advanced and she was discharged on POD 1. She will have outpatient follow up with surgery 2 weeks after discharge.     Consults: IP CONSULT TO SOCIAL WORK/CASE MANAGEMENT  IP CONSULT TO GI  IP CONSULT TO GENERAL SURGERY    Significant Diagnostic Studies:     0545 05/09/20  0501 05/10/20  0539    WBC 6.77 5.32 8.49   HGB 12.0 12.3 11.4*   HCT 37.1 39.4 35.3*    208 199    140 140   K 3.6 3.7 3.9    107 104   CO2 28 22* 26   BUN 13 21* 14   CREATININE 0.8 0.8 0.8   BILITOT 2.4* 1.5* 1.0   * 147* 89*   * 220* 146*   ALKPHOS 123 173* 156*   CALCIUM 9.2 9.2 8.9   ALBUMIN 3.5 3.6 3.2*   PROT 6.8 7.1 6.7   MG 2.3 2.2 2.2   PHOS 3.7 3.8 3.9        \Treatments: IV hydration and surgery: cholecystectomy     Disposition: Home or Self Care    Patient Instructions:   Current Discharge Medication List      START taking these medications    Details   oxyCODONE (ROXICODONE) 5 MG immediate release tablet Take 1 tablet (5 mg total) by mouth every 4 (four) hours as needed.  Qty: 20 tablet, Refills: 0    Comments: Quantity prescribed more than 7 day supply? No         CONTINUE these medications which have NOT CHANGED    Details   rosuvastatin (CRESTOR) 10 MG tablet Take 1 tablet (10 mg total) by mouth once daily.  Qty: 90 tablet, Refills: 1    Associated Diagnoses: Mixed hyperlipidemia      ibuprofen (ADVIL,MOTRIN) 800 MG tablet Take 1 tablet (800 mg total) by mouth 3 (three) times daily as needed for Pain.  Qty: 30 tablet, Refills: 2    Associated Diagnoses: Acute right-sided low back pain with right-sided sciatica      pantoprazole (PROTONIX) 40 MG tablet Take 1 tablet (40 mg total) by mouth once daily.  Qty: 30 tablet, Refills: 0    Associated Diagnoses: Gastroesophageal reflux disease, esophagitis presence not specified      sucralfate (CARAFATE) 1 gram tablet Take 1  tablet (1 g total) by mouth 4 (four) times daily before meals and nightly.  Qty: 20 tablet, Refills: 0    Associated Diagnoses: Gastroesophageal reflux disease, esophagitis presence not specified             Discharge Procedure Orders   Diet Adult Regular     Diet Cardiac     Ice to affected area     Lifting restrictions   Order Comments: Avoid lifting greater than 20 lb, straining, strenuous activity for 4 weeks     Notify your health care provider if you experience any of the following:  temperature >100.4     Notify your health care provider if you experience any of the following:  persistent nausea and vomiting or diarrhea     Notify your health care provider if you experience any of the following:  severe uncontrolled pain     Notify your health care provider if you experience any of the following:  redness, tenderness, or signs of infection (pain, swelling, redness, odor or green/yellow discharge around incision site)     Notify your health care provider if you experience any of the following:  worsening rash     No dressing needed   Order Comments: - A surgical glue has been placed over your incisions. Please leave the glue in place and do not attempt to remove it.   - It is ok to shower using mild soap and water over the incisions the day after your procedure. Pat dry your incisions. Do not soak in a bath tub or other body of water for 2 weeks or until cleared by your surgeon.   - If you noticed redness, swelling, fever, increasing pain or significant drainage from your wound please call the office or the hospital  after hours.     Notify your health care provider if you experience any of the following:  temperature >100.4     Notify your health care provider if you experience any of the following:  persistent nausea and vomiting or diarrhea     Notify your health care provider if you experience any of the following:  redness, tenderness, or signs of infection (pain, swelling, redness, odor or  green/yellow discharge around incision site)     Notify your health care provider if you experience any of the following:  severe uncontrolled pain     Notify your health care provider if you experience any of the following:  difficulty breathing or increased cough     Notify your health care provider if you experience any of the following:  persistent dizziness, light-headedness, or visual disturbances     Notify your health care provider if you experience any of the following:  increased confusion or weakness     Leave dressing on - Keep it clean, dry, and intact until clinic visit     Shower on day dressing removed (No bath)     Activity as tolerated       Kandi Medel  05/10/2020  11:16 AM

## 2020-05-10 NOTE — PROGRESS NOTES
Progress Note    Admit Date: 5/7/2020   LOS: 0 days     SUBJECTIVE:       The patient was seen and examined this morning at the bedside. Patient reports no acute issues overnight. Patient reports that pain is adequately controlled. Patient denies decreased urinary output.Patient denies fevers overnight.  Tolerating a bland diet, feels well and is eager to go home.     Denies f/c/n/v/d/c/cp/palpitations/freq/urgency/dysuria    Continuous Infusions:  Scheduled Meds:   acetaminophen  1,000 mg Oral Q8H    enoxaparin  40 mg Subcutaneous Daily    ketorolac  10 mg Oral Q8H    pantoprazole  40 mg Oral Daily     PRN Meds:Dextrose 10% Bolus, Dextrose 10% Bolus, glucagon (human recombinant), glucose, glucose, melatonin, ondansetron, oxyCODONE, promethazine (PHENERGAN) IVPB, sodium chloride 0.9%    Review of patient's allergies indicates:  No Known Allergies    OBJECTIVE:     Vital Signs (Most Recent)  Temp: 98.7 °F (37.1 °C) (05/10/20 0751)  Pulse: 62 (05/10/20 0751)  Resp: 20 (05/10/20 0751)  BP: (!) 110/59 (05/10/20 0751)  SpO2: (!) 94 % (05/10/20 0436)    Vital Signs Range (Last 24H):  Temp:  [97.3 °F (36.3 °C)-98.7 °F (37.1 °C)]   Pulse:  [57-69]   Resp:  [15-20]   BP: (110-146)/(54-70)   SpO2:  [94 %-100 %]     I & O (Last 24H):    Intake/Output Summary (Last 24 hours) at 5/10/2020 0807  Last data filed at 5/10/2020 0600  Gross per 24 hour   Intake 1465 ml   Output 2400 ml   Net -935 ml     Ventilator Data (Last 24H):          Physical Exam:    GEN - AAOx4, NAD  Oral - MMM; no exudate  CHEST - CTA B ;equal expansion  HEART - rrr; no m/r/s3/s4  ABD - soft; nonttp  EXTR - warm; no edema  NEURO - no asterixis or focal deficits   SKIN - no obvious skin breakdown or rash    Lines/Drains:       Peripheral IV - Single Lumen 05/07/20 1149 20 G Right Forearm (Active)   Site Assessment Clean;Dry;Intact;No redness;No swelling 5/10/2020  3:12 AM   Line Status Flushed;Saline locked 5/9/2020  7:45 PM   Dressing Status  Clean;Dry;Intact 5/9/2020  7:45 PM   Dressing Intervention Integrity maintained 5/9/2020  7:45 PM   Dressing Change Due 05/13/20 5/9/2020  7:45 PM   Site Change Due 05/13/20 5/9/2020  7:45 PM   Reason Not Rotated Not due 5/9/2020  7:45 PM   Number of days: 2            Peripheral IV - Single Lumen 05/09/20 1030 20 G Right Hand (Active)   Site Assessment Clean;Dry;Intact;No redness;No swelling 5/10/2020  3:12 AM   Line Status Flushed;Saline locked 5/9/2020  7:45 PM   Dressing Status Clean;Dry;Intact 5/9/2020  7:45 PM   Dressing Intervention First dressing 5/9/2020  7:45 PM   Dressing Change Due 05/13/20 5/9/2020  7:45 PM   Site Change Due 05/13/20 5/9/2020  7:45 PM   Reason Not Rotated Not due 5/9/2020  7:45 PM   Number of days: 0       Recent Labs     05/08/20  0545 05/09/20  0501 05/10/20  0539   WBC 6.77 5.32 8.49   HGB 12.0 12.3 11.4*   HCT 37.1 39.4 35.3*    208 199    140 140   K 3.6 3.7 3.9    107 104   CO2 28 22* 26   BUN 13 21* 14   CREATININE 0.8 0.8 0.8   BILITOT 2.4* 1.5* 1.0   * 147* 89*   * 220* 146*   ALKPHOS 123 173* 156*   CALCIUM 9.2 9.2 8.9   ALBUMIN 3.5 3.6 3.2*   PROT 6.8 7.1 6.7   MG 2.3 2.2 2.2   PHOS 3.7 3.8 3.9         ASSESSMENT/PLAN:     Alexia Zaragoza is a 59 y.o. female with pmh  has a past medical history of Cluster headaches, Essential hypertension, benign (7/2/2019), History of colon polyps, History of right hemicolectomy (7/2/2019), and Mixed hyperlipidemia (7/10/2019).   who presented with Choledocholithiasis. The primary encounter diagnosis was Choledocholithiasis with acute cholecystitis. Diagnoses of Choledocholithiasis, Mixed hyperlipidemia, and Elevated LFTs were also pertinent to this visit.    Choledocholithiasis:   -S/p lap christie POD #1   Afebrile, VSS  - TBili 1.0, AST 89 , trending down since lap christie  - Abdominal US completed at OSH showed sludge in the dependent portion of the gallbladder and dilation of the CBD (1 cm greatest in  diameter) with suggestion for ERCP vs MRCP to evaluate for a distal common bile duct obstruction  - Case discussed with Dr. Urrutia ([facility]), who [place] [facility]CP in the morning  - Tylenol and Toradol PRN for pain  - Zofran and Phenergan PRN for nausea   - MRCP was normal  - advanced to bland diet   - Pain management   - Continue to monitor     HTN:   - /57 on admit, not currently taking any antihypertensive medications at home  - Stable, will continue to monitor     HLD:   - hold statin in setting of elevated liver ezymes   -resume home medication at discharge      Code: Full  PPx: SCDs, PPI  Diet: bland advanced as tolerated      Dispo: pending diet advancement, possible discharge once tolerating PO and pain management       Kandi Medel MD   [facility]   05/10/2020

## 2020-05-10 NOTE — PLAN OF CARE
Pt is AAOx3 with complaints of abd pain with relief from oxy and toradol. Pt with complaints of nausea with relief from phenergan. Pt tolerating clear liquid diet. Pt up with minimal assist to bathroom.

## 2020-05-10 NOTE — PLAN OF CARE
Discharge orders noted. Additional clinical references attached.    Patient's discharge instructions given by bedside RN and reviewed via this VN.  Education provided on new medication, diagnosis, and follow-up appointments. Patient verbalized understanding. All questions answered. Patient awaiting transport to  home.

## 2020-05-11 LAB
HAV IGM SERPL QL IA: NEGATIVE
HBV CORE IGM SERPL QL IA: NEGATIVE
HBV SURFACE AG SERPL QL IA: NEGATIVE
HCV AB SERPL QL IA: NEGATIVE

## 2020-05-13 ENCOUNTER — TELEPHONE (OUTPATIENT)
Dept: SURGERY | Facility: CLINIC | Age: 60
End: 2020-05-13

## 2020-05-13 NOTE — TELEPHONE ENCOUNTER
5/13/20  3:46pm     I returned the patient's phone call and spoke with her daughter in law. Her daughter in law wanted to know which medications the patient should be taking. I informed her of the medications that we have on file, and I also told her how each medicine should be taken. Patient's daughter in law verbally stated that she understands and will make sure that her mother in law takes her medicine accordingly.            ----- Message from Javier Contreras sent at 5/13/2020 10:04 AM CDT -----  Contact: Kristyn/daughter in law [phone]  Alexia Zaragoza daughter in law calling to speak with you concerning the patient medication   Please advise

## 2020-05-14 LAB
FINAL PATHOLOGIC DIAGNOSIS: NORMAL
GROSS: NORMAL

## 2020-05-21 ENCOUNTER — NURSE TRIAGE (OUTPATIENT)
Dept: ADMINISTRATIVE | Facility: CLINIC | Age: 60
End: 2020-05-21

## 2020-05-21 NOTE — TELEPHONE ENCOUNTER
Patient contacted for post procedure symptoms monitoring. Pt denies cough, fever, or sob post procedure.     Reason for Disposition   Information only question and nurse able to answer    Additional Information   Negative: Nursing judgment   Negative: Nursing judgment   Negative: Nursing judgment   Negative: Nursing judgment    Protocols used: NO PROTOCOL AVAILABLE - INFORMATION ONLY-A-[place]

## 2020-06-01 ENCOUNTER — TELEPHONE (OUTPATIENT)
Dept: SURGERY | Facility: CLINIC | Age: 60
End: 2020-06-01

## 2020-06-01 DIAGNOSIS — G89.18 POST-OP PAIN: Primary | ICD-10-CM

## 2020-06-01 RX ORDER — KETOROLAC TROMETHAMINE 10 MG/1
10 TABLET, FILM COATED ORAL EVERY 6 HOURS PRN
Qty: 20 TABLET | Refills: 0 | Status: SHIPPED | OUTPATIENT
Start: 2020-06-01 | End: 2020-06-06

## 2020-06-01 NOTE — LETTER
June 1, 2020    Alexia Zaragoza  [address]  [place] [address]         [facility]  [address]  [facility] [address]  Phone: [phone] June 1, 2020     Patient: Alexia Zaragoza   YOB: 1960   Date of Visit: 6/1/2020       To Whom It May Concern:    It is my medical opinion that Alexia Zaragoza should remain out of work while she recovers form a surgical procedure. She may return to work on June 6, 2020.    If you have any questions or concerns, please don't hesitate to call.    Sincerely,        Mayo Johnson Jr., MD

## 2020-06-01 NOTE — PROGRESS NOTES
Telephone Encounter    Patient is status post robotic cholecystectomy on 05/09/2020 for acute cholecystitis.  Her postop visit was scheduled for 06/01/2020 however due to overlap and scheduling her appointment had to be canceled.  Patient was in agreement to conduct postoperative visit over the phone.    Postoperatively the patient has been doing okay.  She still has some pain in the right lower abdomen over an area of previous hernia site.  Also has some soreness at the incision just to the left of the umbilicus (extraction site).  She however is not have any severe pain, nausea, vomiting, fevers, chills, p.o. intolerance.  Her incisions are healed without any signs of infection or breakdown.    Pathology  Gallbladder, cholecystectomy:  - Chronic cholecystitis.    Assessment and Plan  Status post robotic cholecystectomy  Pathology benign  Reports appropriate healing of incisions  Continue soreness and pain at the left umbilical port site/extraction site expected as this had to be extended to remove the inflamed gallbladder, recommend continue watchful waiting and called in prescription for Toradol  Address the patient's concern in regards to her previous right lower quadrant hernia, during the surgery significant scar tissue was visualized in this area but no obvious hernia recurrence was present, lysis of adhesions was performed partially to allow port placement and port sites through the mesh were closed with permanent suture  Patient instructed to call with any questions or concerns, and if she feels she needs to be seen and person we will be happy to reschedule her visit

## 2020-06-01 NOTE — TELEPHONE ENCOUNTER
6/1/20  8:17am  Attempted to reach patient regarding cancelled appointment this morning. No answer. Message left via voicemail.

## 2020-07-02 DIAGNOSIS — Z12.39 BREAST CANCER SCREENING: ICD-10-CM

## 2020-08-31 ENCOUNTER — OFFICE VISIT (OUTPATIENT)
Dept: PRIMARY CARE CLINIC | Facility: CLINIC | Age: 60
End: 2020-08-31
Payer: COMMERCIAL

## 2020-08-31 VITALS
WEIGHT: 168.88 LBS | SYSTOLIC BLOOD PRESSURE: 136 MMHG | BODY MASS INDEX: 26.51 KG/M2 | OXYGEN SATURATION: 97 % | HEART RATE: 72 BPM | DIASTOLIC BLOOD PRESSURE: 88 MMHG | RESPIRATION RATE: 16 BRPM | HEIGHT: 67 IN

## 2020-08-31 DIAGNOSIS — F41.0 GENERALIZED ANXIETY DISORDER WITH PANIC ATTACKS: Primary | ICD-10-CM

## 2020-08-31 DIAGNOSIS — F41.1 GENERALIZED ANXIETY DISORDER WITH PANIC ATTACKS: Primary | ICD-10-CM

## 2020-08-31 DIAGNOSIS — R07.89 ATYPICAL CHEST PAIN: ICD-10-CM

## 2020-08-31 PROCEDURE — 99214 OFFICE O/P EST MOD 30 MIN: CPT | Mod: S$GLB,,, | Performed by: FAMILY MEDICINE

## 2020-08-31 PROCEDURE — 93005 EKG 12-LEAD: ICD-10-PCS | Mod: S$GLB,,, | Performed by: FAMILY MEDICINE

## 2020-08-31 PROCEDURE — 93005 ELECTROCARDIOGRAM TRACING: CPT | Mod: S$GLB,,, | Performed by: FAMILY MEDICINE

## 2020-08-31 PROCEDURE — 3079F DIAST BP 80-89 MM HG: CPT | Mod: CPTII,S$GLB,, | Performed by: FAMILY MEDICINE

## 2020-08-31 PROCEDURE — 3008F BODY MASS INDEX DOCD: CPT | Mod: CPTII,S$GLB,, | Performed by: FAMILY MEDICINE

## 2020-08-31 PROCEDURE — 3075F SYST BP GE 130 - 139MM HG: CPT | Mod: CPTII,S$GLB,, | Performed by: FAMILY MEDICINE

## 2020-08-31 PROCEDURE — 93010 EKG 12-LEAD: ICD-10-PCS | Mod: S$GLB,,, | Performed by: INTERNAL MEDICINE

## 2020-08-31 PROCEDURE — 3079F PR MOST RECENT DIASTOLIC BLOOD PRESSURE 80-89 MM HG: ICD-10-PCS | Mod: CPTII,S$GLB,, | Performed by: FAMILY MEDICINE

## 2020-08-31 PROCEDURE — 99999 PR PBB SHADOW E&M-EST. PATIENT-LVL III: ICD-10-PCS | Mod: PBBFAC,,, | Performed by: FAMILY MEDICINE

## 2020-08-31 PROCEDURE — 3075F PR MOST RECENT SYSTOLIC BLOOD PRESS GE 130-139MM HG: ICD-10-PCS | Mod: CPTII,S$GLB,, | Performed by: FAMILY MEDICINE

## 2020-08-31 PROCEDURE — 93010 ELECTROCARDIOGRAM REPORT: CPT | Mod: S$GLB,,, | Performed by: INTERNAL MEDICINE

## 2020-08-31 PROCEDURE — 3008F PR BODY MASS INDEX (BMI) DOCUMENTED: ICD-10-PCS | Mod: CPTII,S$GLB,, | Performed by: FAMILY MEDICINE

## 2020-08-31 PROCEDURE — 99999 PR PBB SHADOW E&M-EST. PATIENT-LVL III: CPT | Mod: PBBFAC,,, | Performed by: FAMILY MEDICINE

## 2020-08-31 PROCEDURE — 99214 PR OFFICE/OUTPT VISIT, EST, LEVL IV, 30-39 MIN: ICD-10-PCS | Mod: S$GLB,,, | Performed by: FAMILY MEDICINE

## 2020-08-31 RX ORDER — ALPRAZOLAM 1 MG/1
.5-1 TABLET ORAL DAILY PRN
Qty: 30 TABLET | Refills: 0 | Status: SHIPPED | OUTPATIENT
Start: 2020-08-31 | End: 2021-05-25

## 2020-08-31 RX ORDER — ESCITALOPRAM OXALATE 10 MG/1
10 TABLET ORAL DAILY
Qty: 90 TABLET | Refills: 0 | Status: SHIPPED | OUTPATIENT
Start: 2020-08-31 | End: 2020-09-30 | Stop reason: SDUPTHER

## 2020-08-31 NOTE — PROGRESS NOTES
"Subjective:       Patient ID: Alexia Zaragoza is a 59 y.o. female.    Chief Complaint: Anxiety    Has been having progressively worsening anxiety and panic attacks for the past several months, worsening, says "everything stresses me out." When she has an attack, she feels like she can't breathe, chest feels tight, gets very scared and nervous, ears start to ring. Having trouble sleeping, mind racing. Eating OK, patient is s/p gastric sleeve. Works at [employer] as . Lives with boyfriend of 26 years, getting along well. No prior hx of anxiety or depression. Not exercising regularly.    Review of Systems   Constitutional: Negative for fever and unexpected weight change.   HENT: Positive for ear pain. Negative for trouble swallowing.    Eyes: Negative for visual disturbance.   Respiratory: Positive for chest tightness and shortness of breath.    Cardiovascular: Positive for chest pain.   Gastrointestinal: Positive for nausea. Negative for vomiting.   Musculoskeletal: Negative for joint swelling.   Allergic/Immunologic: Negative for immunocompromised state.   Neurological: Negative for dizziness and seizures.   Psychiatric/Behavioral: Positive for sleep disturbance. Negative for self-injury and suicidal ideas. The patient is nervous/anxious.        Objective:      Vitals:    08/31/20 1604   BP: 136/88   BP Location: Left arm   Patient Position: Sitting   BP Method: Medium (Manual)   Pulse: 72   Resp: 16   SpO2: 97%   Weight: 76.6 kg (168 lb 14 oz)   Height: 5' 7" (1.702 m)     Physical Exam    Lab Results   Component Value Date    WBC 8.49 05/10/2020    HGB 11.4 (L) 05/10/2020    HCT 35.3 (L) 05/10/2020     05/10/2020    CHOL 199 03/09/2020    TRIG 164 (H) 03/09/2020    HDL 51 03/09/2020     (H) 05/10/2020    AST 89 (H) 05/10/2020     05/10/2020    K 3.9 05/10/2020     05/10/2020    CREATININE 0.8 05/10/2020    BUN 14 05/10/2020    CO2 26 05/10/2020    TSH 0.761 05/07/2020    " HGBA1C 5.8 (H) 05/07/2020      Assessment:       1. Generalized anxiety disorder with panic attacks    2. Atypical chest pain        Plan:       Generalized anxiety disorder with panic attacks  -     escitalopram oxalate (LEXAPRO) 10 MG tablet; Take 1 tablet (10 mg total) by mouth once daily.  Dispense: 90 tablet; Refill: 0  -     ALPRAZolam (XANAX) 1 MG tablet; Take 0.5-1 tablets (0.5-1 mg total) by mouth daily as needed for Anxiety.  Dispense: 30 tablet; Refill: 0  Reassess in 4 weeks  Atypical chest pain  -     EKG 12-lead  EKG normal    Medication List with Changes/Refills   New Medications    ALPRAZOLAM (XANAX) 1 MG TABLET    Take 0.5-1 tablets (0.5-1 mg total) by mouth daily as needed for Anxiety.    ESCITALOPRAM OXALATE (LEXAPRO) 10 MG TABLET    Take 1 tablet (10 mg total) by mouth once daily.   Current Medications    ROSUVASTATIN (CRESTOR) 10 MG TABLET    Take 1 tablet (10 mg total) by mouth once daily.   Discontinued Medications    PANTOPRAZOLE (PROTONIX) 40 MG TABLET    Take 1 tablet (40 mg total) by mouth once daily.    SUCRALFATE (CARAFATE) 1 GRAM TABLET    Take 1 tablet (1 g total) by mouth 4 (four) times daily before meals and nightly.

## 2020-09-01 DIAGNOSIS — E78.2 MIXED HYPERLIPIDEMIA: ICD-10-CM

## 2020-09-01 RX ORDER — ROSUVASTATIN CALCIUM 10 MG/1
TABLET, COATED ORAL
Qty: 90 TABLET | Refills: 3 | Status: SHIPPED | OUTPATIENT
Start: 2020-09-01 | End: 2021-05-25

## 2020-09-11 ENCOUNTER — TELEPHONE (OUTPATIENT)
Dept: PRIMARY CARE CLINIC | Facility: CLINIC | Age: 60
End: 2020-09-11

## 2020-09-11 NOTE — TELEPHONE ENCOUNTER
She says she just wants to be able to leave if she has an attack. She is not asking for anything long term.

## 2020-09-11 NOTE — TELEPHONE ENCOUNTER
Please clarify what type of leave she is seeking. Does she just need this to excuse her absence from work for her appointments, or is she looking for longer leaves of absence?

## 2020-09-30 ENCOUNTER — OFFICE VISIT (OUTPATIENT)
Dept: PRIMARY CARE CLINIC | Facility: CLINIC | Age: 60
End: 2020-09-30
Payer: COMMERCIAL

## 2020-09-30 DIAGNOSIS — F41.1 GENERALIZED ANXIETY DISORDER WITH PANIC ATTACKS: ICD-10-CM

## 2020-09-30 DIAGNOSIS — F41.0 GENERALIZED ANXIETY DISORDER WITH PANIC ATTACKS: ICD-10-CM

## 2020-09-30 PROCEDURE — 99213 OFFICE O/P EST LOW 20 MIN: CPT | Mod: 95,,, | Performed by: FAMILY MEDICINE

## 2020-09-30 PROCEDURE — 99213 PR OFFICE/OUTPT VISIT, EST, LEVL III, 20-29 MIN: ICD-10-PCS | Mod: 95,,, | Performed by: FAMILY MEDICINE

## 2020-09-30 RX ORDER — ESCITALOPRAM OXALATE 10 MG/1
10 TABLET ORAL DAILY
Qty: 90 TABLET | Refills: 1 | Status: SHIPPED | OUTPATIENT
Start: 2020-09-30 | End: 2021-05-25

## 2020-09-30 NOTE — PROGRESS NOTES
Subjective:       Patient ID: Alexia Zaragoza is a 60 y.o. female.    Chief Complaint: No chief complaint on file.      HPI  The patient location is:  home  The chief complaint leading to consultation is:  Follow-up on anxiety    Visit type: audiovisual    Face to Face time with patient:  6 minutes  Ten minutes of total time spent on the encounter, which includes face to face time and non-face to face time preparing to see the patient (eg, review of tests), Obtaining and/or reviewing separately obtained history, Documenting clinical information in the electronic or other health record, Independently interpreting results (not separately reported) and communicating results to the patient/family/caregiver, or Care coordination (not separately reported).         Each patient to whom he or she provides medical services by telemedicine is:  (1) informed of the relationship between the physician and patient and the respective role of any other health care provider with respect to management of the patient; and (2) notified that he or she may decline to receive medical services by telemedicine and may withdraw from such care at any time.    Notes:  Says she has had a couple of panic attacks since her last visit, only has taken Xanax a few times because it makes her sleepy, though it does help.  Has not been taking Lexapro daily, either, apparently did not realize she was supposed to take it every day.  Says she has only taken it about 4 times, no adverse side effects noted.  Review of Systems   Constitutional: Negative for appetite change and unexpected weight change.   Respiratory: Positive for chest tightness.    Psychiatric/Behavioral: Negative for agitation, confusion and dysphoric mood. The patient is nervous/anxious.        Objective:      There were no vitals filed for this visit.  Physical Exam  Constitutional:       Appearance: She is well-developed.   Pulmonary:      Effort: No respiratory distress.    Neurological:      Mental Status: She is alert and oriented to person, place, and time.   Psychiatric:         Behavior: Behavior normal.         Lab Results   Component Value Date    WBC 8.49 05/10/2020    HGB 11.4 (L) 05/10/2020    HCT 35.3 (L) 05/10/2020     05/10/2020    CHOL 199 03/09/2020    TRIG 164 (H) 03/09/2020    HDL 51 03/09/2020     (H) 05/10/2020    AST 89 (H) 05/10/2020     05/10/2020    K 3.9 05/10/2020     05/10/2020    CREATININE 0.8 05/10/2020    BUN 14 05/10/2020    CO2 26 05/10/2020    TSH 0.761 05/07/2020    HGBA1C 5.8 (H) 05/07/2020      Assessment:       1. Generalized anxiety disorder with panic attacks        Plan:       Generalized anxiety disorder with panic attacks  -     escitalopram oxalate (LEXAPRO) 10 MG tablet; Take 1 tablet (10 mg total) by mouth once daily.  Dispense: 90 tablet; Refill: 1  Patient advised to start taking Lexapro daily.  Will check back with her in a couple of weeks to see how she is doing    Medication List with Changes/Refills   Current Medications    ALPRAZOLAM (XANAX) 1 MG TABLET    Take 0.5-1 tablets (0.5-1 mg total) by mouth daily as needed for Anxiety.    ROSUVASTATIN (CRESTOR) 10 MG TABLET    TAKE 1 TABLET BY MOUTH EVERY DAY   Changed and/or Refilled Medications    Modified Medication Previous Medication    ESCITALOPRAM OXALATE (LEXAPRO) 10 MG TABLET escitalopram oxalate (LEXAPRO) 10 MG tablet       Take 1 tablet (10 mg total) by mouth once daily.    Take 1 tablet (10 mg total) by mouth once daily.

## 2021-01-12 ENCOUNTER — TELEPHONE (OUTPATIENT)
Dept: PRIMARY CARE CLINIC | Facility: CLINIC | Age: 61
End: 2021-01-12

## 2021-01-15 ENCOUNTER — OFFICE VISIT (OUTPATIENT)
Dept: PRIMARY CARE CLINIC | Facility: CLINIC | Age: 61
End: 2021-01-15
Payer: COMMERCIAL

## 2021-01-15 DIAGNOSIS — M54.32 LEFT SIDED SCIATICA: Primary | ICD-10-CM

## 2021-01-15 PROCEDURE — 99213 PR OFFICE/OUTPT VISIT, EST, LEVL III, 20-29 MIN: ICD-10-PCS | Mod: 95,,, | Performed by: FAMILY MEDICINE

## 2021-01-15 PROCEDURE — 99213 OFFICE O/P EST LOW 20 MIN: CPT | Mod: 95,,, | Performed by: FAMILY MEDICINE

## 2021-01-15 RX ORDER — PREDNISONE 20 MG/1
TABLET ORAL
Qty: 10 TABLET | Refills: 0 | Status: SHIPPED | OUTPATIENT
Start: 2021-01-15 | End: 2021-01-22

## 2021-04-16 ENCOUNTER — PATIENT MESSAGE (OUTPATIENT)
Dept: RESEARCH | Facility: HOSPITAL | Age: 61
End: 2021-04-16

## 2021-05-25 ENCOUNTER — OFFICE VISIT (OUTPATIENT)
Dept: PRIMARY CARE CLINIC | Facility: CLINIC | Age: 61
End: 2021-05-25
Payer: COMMERCIAL

## 2021-05-25 VITALS
BODY MASS INDEX: 25.51 KG/M2 | RESPIRATION RATE: 16 BRPM | DIASTOLIC BLOOD PRESSURE: 76 MMHG | SYSTOLIC BLOOD PRESSURE: 118 MMHG | WEIGHT: 162.5 LBS | OXYGEN SATURATION: 99 % | HEART RATE: 61 BPM | HEIGHT: 67 IN

## 2021-05-25 DIAGNOSIS — M54.32 LEFT SIDED SCIATICA: Primary | ICD-10-CM

## 2021-05-25 PROCEDURE — 96372 THER/PROPH/DIAG INJ SC/IM: CPT | Mod: S$GLB,,, | Performed by: FAMILY MEDICINE

## 2021-05-25 PROCEDURE — 3008F PR BODY MASS INDEX (BMI) DOCUMENTED: ICD-10-PCS | Mod: CPTII,S$GLB,, | Performed by: FAMILY MEDICINE

## 2021-05-25 PROCEDURE — 1125F PR PAIN SEVERITY QUANTIFIED, PAIN PRESENT: ICD-10-PCS | Mod: S$GLB,,, | Performed by: FAMILY MEDICINE

## 2021-05-25 PROCEDURE — 99213 OFFICE O/P EST LOW 20 MIN: CPT | Mod: 25,S$GLB,, | Performed by: FAMILY MEDICINE

## 2021-05-25 PROCEDURE — 99999 PR PBB SHADOW E&M-EST. PATIENT-LVL III: ICD-10-PCS | Mod: PBBFAC,,, | Performed by: FAMILY MEDICINE

## 2021-05-25 PROCEDURE — 1125F AMNT PAIN NOTED PAIN PRSNT: CPT | Mod: S$GLB,,, | Performed by: FAMILY MEDICINE

## 2021-05-25 PROCEDURE — 99999 PR PBB SHADOW E&M-EST. PATIENT-LVL III: CPT | Mod: PBBFAC,,, | Performed by: FAMILY MEDICINE

## 2021-05-25 PROCEDURE — 96372 PR INJECTION,THERAP/PROPH/DIAG2ST, IM OR SUBCUT: ICD-10-PCS | Mod: S$GLB,,, | Performed by: FAMILY MEDICINE

## 2021-05-25 PROCEDURE — 3008F BODY MASS INDEX DOCD: CPT | Mod: CPTII,S$GLB,, | Performed by: FAMILY MEDICINE

## 2021-05-25 PROCEDURE — 99213 PR OFFICE/OUTPT VISIT, EST, LEVL III, 20-29 MIN: ICD-10-PCS | Mod: 25,S$GLB,, | Performed by: FAMILY MEDICINE

## 2021-05-25 RX ORDER — METHYLPREDNISOLONE 4 MG/1
TABLET ORAL
Qty: 1 PACKAGE | Refills: 0 | Status: SHIPPED | OUTPATIENT
Start: 2021-05-25 | End: 2021-06-08

## 2021-05-25 RX ORDER — TRIAMCINOLONE ACETONIDE 40 MG/ML
80 INJECTION, SUSPENSION INTRA-ARTICULAR; INTRAMUSCULAR ONCE
Status: COMPLETED | OUTPATIENT
Start: 2021-05-25 | End: 2021-05-25

## 2021-05-25 RX ADMIN — TRIAMCINOLONE ACETONIDE 80 MG: 40 INJECTION, SUSPENSION INTRA-ARTICULAR; INTRAMUSCULAR at 10:05

## 2021-06-07 ENCOUNTER — PATIENT OUTREACH (OUTPATIENT)
Dept: ADMINISTRATIVE | Facility: OTHER | Age: 61
End: 2021-06-07

## 2021-06-08 ENCOUNTER — OFFICE VISIT (OUTPATIENT)
Dept: DERMATOLOGY | Facility: CLINIC | Age: 61
End: 2021-06-08
Payer: COMMERCIAL
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
## 2021-06-08 PROCEDURE — 99999 PR PBB SHADOW E&M-EST. PATIENT-LVL II: ICD-10-PCS | Mod: PBBFAC,,, | Performed by: DERMATOLOGY

## 2021-06-08 PROCEDURE — 99203 PR OFFICE/OUTPT VISIT, NEW, LEVL III, 30-44 MIN: ICD-10-PCS | Mod: S$GLB,,, | Performed by: DERMATOLOGY

## 2021-06-08 PROCEDURE — 99203 OFFICE O/P NEW LOW 30 MIN: CPT | Mod: S$GLB,,, | Performed by: DERMATOLOGY

## 2021-06-08 PROCEDURE — 99999 PR PBB SHADOW E&M-EST. PATIENT-LVL II: CPT | Mod: PBBFAC,,, | Performed by: DERMATOLOGY

## 2021-06-08 RX ORDER — CLOBETASOL PROPIONATE 0.5 MG/G
CREAM TOPICAL
Qty: 60 G | Refills: 2 | Status: SHIPPED | OUTPATIENT
Start: 2021-06-08 | End: 2021-08-12

## 2021-06-08 RX ORDER — PERMETHRIN 50 MG/G
CREAM TOPICAL
Qty: 120 G | Refills: 1 | Status: SHIPPED | OUTPATIENT
Start: 2021-06-08 | End: 2021-08-12

## 2021-08-12 ENCOUNTER — OFFICE VISIT (OUTPATIENT)
Dept: PRIMARY CARE CLINIC | Facility: CLINIC | Age: 61
End: 2021-08-12
Payer: COMMERCIAL

## 2021-08-12 ENCOUNTER — NURSE TRIAGE (OUTPATIENT)
Dept: ADMINISTRATIVE | Facility: CLINIC | Age: 61
End: 2021-08-12

## 2021-08-12 VITALS
RESPIRATION RATE: 16 BRPM | OXYGEN SATURATION: 98 % | BODY MASS INDEX: 26.06 KG/M2 | WEIGHT: 166 LBS | HEIGHT: 67 IN | HEART RATE: 84 BPM | DIASTOLIC BLOOD PRESSURE: 72 MMHG | SYSTOLIC BLOOD PRESSURE: 134 MMHG

## 2021-08-12 DIAGNOSIS — N30.01 ACUTE CYSTITIS WITH HEMATURIA: Primary | ICD-10-CM

## 2021-08-12 PROCEDURE — 1126F PR PAIN SEVERITY QUANTIFIED, NO PAIN PRESENT: ICD-10-PCS | Mod: CPTII,S$GLB,, | Performed by: FAMILY MEDICINE

## 2021-08-12 PROCEDURE — 99213 PR OFFICE/OUTPT VISIT, EST, LEVL III, 20-29 MIN: ICD-10-PCS | Mod: S$GLB,,, | Performed by: FAMILY MEDICINE

## 2021-08-12 PROCEDURE — 3008F PR BODY MASS INDEX (BMI) DOCUMENTED: ICD-10-PCS | Mod: CPTII,S$GLB,, | Performed by: FAMILY MEDICINE

## 2021-08-12 PROCEDURE — 3078F DIAST BP <80 MM HG: CPT | Mod: CPTII,S$GLB,, | Performed by: FAMILY MEDICINE

## 2021-08-12 PROCEDURE — 99999 PR PBB SHADOW E&M-EST. PATIENT-LVL III: CPT | Mod: PBBFAC,,, | Performed by: FAMILY MEDICINE

## 2021-08-12 PROCEDURE — 1126F AMNT PAIN NOTED NONE PRSNT: CPT | Mod: CPTII,S$GLB,, | Performed by: FAMILY MEDICINE

## 2021-08-12 PROCEDURE — 3078F PR MOST RECENT DIASTOLIC BLOOD PRESSURE < 80 MM HG: ICD-10-PCS | Mod: CPTII,S$GLB,, | Performed by: FAMILY MEDICINE

## 2021-08-12 PROCEDURE — 87086 URINE CULTURE/COLONY COUNT: CPT | Performed by: FAMILY MEDICINE

## 2021-08-12 PROCEDURE — 87077 CULTURE AEROBIC IDENTIFY: CPT | Performed by: FAMILY MEDICINE

## 2021-08-12 PROCEDURE — 87186 SC STD MICRODIL/AGAR DIL: CPT | Performed by: FAMILY MEDICINE

## 2021-08-12 PROCEDURE — 99213 OFFICE O/P EST LOW 20 MIN: CPT | Mod: S$GLB,,, | Performed by: FAMILY MEDICINE

## 2021-08-12 PROCEDURE — 1160F PR REVIEW ALL MEDS BY PRESCRIBER/CLIN PHARMACIST DOCUMENTED: ICD-10-PCS | Mod: CPTII,S$GLB,, | Performed by: FAMILY MEDICINE

## 2021-08-12 PROCEDURE — 3008F BODY MASS INDEX DOCD: CPT | Mod: CPTII,S$GLB,, | Performed by: FAMILY MEDICINE

## 2021-08-12 PROCEDURE — 3075F SYST BP GE 130 - 139MM HG: CPT | Mod: CPTII,S$GLB,, | Performed by: FAMILY MEDICINE

## 2021-08-12 PROCEDURE — 1160F RVW MEDS BY RX/DR IN RCRD: CPT | Mod: CPTII,S$GLB,, | Performed by: FAMILY MEDICINE

## 2021-08-12 PROCEDURE — 3075F PR MOST RECENT SYSTOLIC BLOOD PRESS GE 130-139MM HG: ICD-10-PCS | Mod: CPTII,S$GLB,, | Performed by: FAMILY MEDICINE

## 2021-08-12 PROCEDURE — 99999 PR PBB SHADOW E&M-EST. PATIENT-LVL III: ICD-10-PCS | Mod: PBBFAC,,, | Performed by: FAMILY MEDICINE

## 2021-08-12 PROCEDURE — 87088 URINE BACTERIA CULTURE: CPT | Performed by: FAMILY MEDICINE

## 2021-08-12 PROCEDURE — 1159F PR MEDICATION LIST DOCUMENTED IN MEDICAL RECORD: ICD-10-PCS | Mod: CPTII,S$GLB,, | Performed by: FAMILY MEDICINE

## 2021-08-12 PROCEDURE — 1159F MED LIST DOCD IN RCRD: CPT | Mod: CPTII,S$GLB,, | Performed by: FAMILY MEDICINE

## 2021-08-12 RX ORDER — PHENAZOPYRIDINE HYDROCHLORIDE 200 MG/1
200 TABLET, FILM COATED ORAL 3 TIMES DAILY
Qty: 6 TABLET | Refills: 0 | Status: SHIPPED | OUTPATIENT
Start: 2021-08-12 | End: 2021-08-14

## 2021-08-12 RX ORDER — NITROFURANTOIN 25; 75 MG/1; MG/1
100 CAPSULE ORAL 2 TIMES DAILY
Qty: 14 CAPSULE | Refills: 0 | Status: SHIPPED | OUTPATIENT
Start: 2021-08-12 | End: 2021-08-19

## 2021-08-15 LAB — BACTERIA UR CULT: ABNORMAL

## 2021-08-20 ENCOUNTER — TELEPHONE (OUTPATIENT)
Dept: PRIMARY CARE CLINIC | Facility: CLINIC | Age: 61
End: 2021-08-20

## 2021-11-12 ENCOUNTER — OFFICE VISIT (OUTPATIENT)
Dept: PRIMARY CARE CLINIC | Facility: CLINIC | Age: 61
End: 2021-11-12
Payer: COMMERCIAL

## 2021-11-12 VITALS
SYSTOLIC BLOOD PRESSURE: 122 MMHG | BODY MASS INDEX: 25.6 KG/M2 | WEIGHT: 163.13 LBS | DIASTOLIC BLOOD PRESSURE: 84 MMHG | OXYGEN SATURATION: 97 % | HEIGHT: 67 IN | RESPIRATION RATE: 18 BRPM | HEART RATE: 67 BPM

## 2021-11-12 DIAGNOSIS — J01.90 ACUTE NON-RECURRENT SINUSITIS, UNSPECIFIED LOCATION: ICD-10-CM

## 2021-11-12 DIAGNOSIS — Z23 NEED FOR VACCINATION: Primary | ICD-10-CM

## 2021-11-12 DIAGNOSIS — H92.01 POSTERIOR AURICULAR PAIN OF RIGHT EAR: ICD-10-CM

## 2021-11-12 DIAGNOSIS — S13.9XXA NECK SPRAIN, INITIAL ENCOUNTER: ICD-10-CM

## 2021-11-12 DIAGNOSIS — Z12.31 ENCOUNTER FOR SCREENING MAMMOGRAM FOR BREAST CANCER: ICD-10-CM

## 2021-11-12 PROCEDURE — 99214 PR OFFICE/OUTPT VISIT, EST, LEVL IV, 30-39 MIN: ICD-10-PCS | Mod: 25,S$GLB,, | Performed by: INTERNAL MEDICINE

## 2021-11-12 PROCEDURE — 90686 FLU VACCINE (QUAD) GREATER THAN OR EQUAL TO 3YO PRESERVATIVE FREE IM: ICD-10-PCS | Mod: S$GLB,,, | Performed by: INTERNAL MEDICINE

## 2021-11-12 PROCEDURE — 3079F PR MOST RECENT DIASTOLIC BLOOD PRESSURE 80-89 MM HG: ICD-10-PCS | Mod: CPTII,S$GLB,, | Performed by: INTERNAL MEDICINE

## 2021-11-12 PROCEDURE — 3074F PR MOST RECENT SYSTOLIC BLOOD PRESSURE < 130 MM HG: ICD-10-PCS | Mod: CPTII,S$GLB,, | Performed by: INTERNAL MEDICINE

## 2021-11-12 PROCEDURE — 90471 FLU VACCINE (QUAD) GREATER THAN OR EQUAL TO 3YO PRESERVATIVE FREE IM: ICD-10-PCS | Mod: S$GLB,,, | Performed by: INTERNAL MEDICINE

## 2021-11-12 PROCEDURE — 3074F SYST BP LT 130 MM HG: CPT | Mod: CPTII,S$GLB,, | Performed by: INTERNAL MEDICINE

## 2021-11-12 PROCEDURE — 1159F MED LIST DOCD IN RCRD: CPT | Mod: CPTII,S$GLB,, | Performed by: INTERNAL MEDICINE

## 2021-11-12 PROCEDURE — 99214 OFFICE O/P EST MOD 30 MIN: CPT | Mod: 25,S$GLB,, | Performed by: INTERNAL MEDICINE

## 2021-11-12 PROCEDURE — 1159F PR MEDICATION LIST DOCUMENTED IN MEDICAL RECORD: ICD-10-PCS | Mod: CPTII,S$GLB,, | Performed by: INTERNAL MEDICINE

## 2021-11-12 PROCEDURE — 99999 PR PBB SHADOW E&M-EST. PATIENT-LVL III: ICD-10-PCS | Mod: PBBFAC,,, | Performed by: INTERNAL MEDICINE

## 2021-11-12 PROCEDURE — 3008F PR BODY MASS INDEX (BMI) DOCUMENTED: ICD-10-PCS | Mod: CPTII,S$GLB,, | Performed by: INTERNAL MEDICINE

## 2021-11-12 PROCEDURE — 1160F PR REVIEW ALL MEDS BY PRESCRIBER/CLIN PHARMACIST DOCUMENTED: ICD-10-PCS | Mod: CPTII,S$GLB,, | Performed by: INTERNAL MEDICINE

## 2021-11-12 PROCEDURE — 3008F BODY MASS INDEX DOCD: CPT | Mod: CPTII,S$GLB,, | Performed by: INTERNAL MEDICINE

## 2021-11-12 PROCEDURE — 96372 THER/PROPH/DIAG INJ SC/IM: CPT | Mod: 59,S$GLB,, | Performed by: INTERNAL MEDICINE

## 2021-11-12 PROCEDURE — 90471 IMMUNIZATION ADMIN: CPT | Mod: S$GLB,,, | Performed by: INTERNAL MEDICINE

## 2021-11-12 PROCEDURE — 96372 PR INJECTION,THERAP/PROPH/DIAG2ST, IM OR SUBCUT: ICD-10-PCS | Mod: 59,S$GLB,, | Performed by: INTERNAL MEDICINE

## 2021-11-12 PROCEDURE — 1160F RVW MEDS BY RX/DR IN RCRD: CPT | Mod: CPTII,S$GLB,, | Performed by: INTERNAL MEDICINE

## 2021-11-12 PROCEDURE — 3079F DIAST BP 80-89 MM HG: CPT | Mod: CPTII,S$GLB,, | Performed by: INTERNAL MEDICINE

## 2021-11-12 PROCEDURE — 99999 PR PBB SHADOW E&M-EST. PATIENT-LVL III: CPT | Mod: PBBFAC,,, | Performed by: INTERNAL MEDICINE

## 2021-11-12 PROCEDURE — 90686 IIV4 VACC NO PRSV 0.5 ML IM: CPT | Mod: S$GLB,,, | Performed by: INTERNAL MEDICINE

## 2021-11-12 RX ORDER — TIZANIDINE 4 MG/1
4 TABLET ORAL 2 TIMES DAILY PRN
Qty: 30 TABLET | Refills: 0 | Status: SHIPPED | OUTPATIENT
Start: 2021-11-12 | End: 2021-11-22

## 2021-11-12 RX ORDER — HYDROCODONE BITARTRATE AND ACETAMINOPHEN 5; 325 MG/1; MG/1
1 TABLET ORAL EVERY 8 HOURS PRN
Qty: 20 TABLET | Refills: 0 | Status: SHIPPED | OUTPATIENT
Start: 2021-11-12 | End: 2022-06-07

## 2021-11-12 RX ORDER — PREDNISONE 20 MG/1
20 TABLET ORAL 2 TIMES DAILY
Qty: 10 TABLET | Refills: 0 | Status: SHIPPED | OUTPATIENT
Start: 2021-11-12 | End: 2021-11-17

## 2021-11-12 RX ORDER — TRIAMCINOLONE ACETONIDE 40 MG/ML
60 INJECTION, SUSPENSION INTRA-ARTICULAR; INTRAMUSCULAR ONCE
Status: COMPLETED | OUTPATIENT
Start: 2021-11-12 | End: 2021-11-12

## 2021-11-12 RX ORDER — AMOXICILLIN AND CLAVULANATE POTASSIUM 875; 125 MG/1; MG/1
1 TABLET, FILM COATED ORAL EVERY 12 HOURS
Qty: 20 TABLET | Refills: 0 | Status: SHIPPED | OUTPATIENT
Start: 2021-11-12 | End: 2021-11-30

## 2021-11-12 RX ADMIN — TRIAMCINOLONE ACETONIDE 60 MG: 40 INJECTION, SUSPENSION INTRA-ARTICULAR; INTRAMUSCULAR at 12:11

## 2021-11-26 ENCOUNTER — HOSPITAL ENCOUNTER (OUTPATIENT)
Dept: RADIOLOGY | Facility: HOSPITAL | Age: 61
Discharge: HOME | End: 2021-11-26
Attending: INTERNAL MEDICINE
Payer: COMMERCIAL

## 2021-11-26 DIAGNOSIS — Z12.31 ENCOUNTER FOR SCREENING MAMMOGRAM FOR BREAST CANCER: ICD-10-CM

## 2021-11-26 PROCEDURE — 77067 SCR MAMMO BI INCL CAD: CPT | Mod: TC,PO

## 2021-11-30 ENCOUNTER — OFFICE VISIT (OUTPATIENT)
Dept: PRIMARY CARE CLINIC | Facility: CLINIC | Age: 61
End: 2021-11-30
Payer: COMMERCIAL

## 2021-11-30 VITALS
BODY MASS INDEX: 25.99 KG/M2 | RESPIRATION RATE: 18 BRPM | OXYGEN SATURATION: 98 % | HEART RATE: 69 BPM | DIASTOLIC BLOOD PRESSURE: 82 MMHG | HEIGHT: 67 IN | WEIGHT: 165.56 LBS | SYSTOLIC BLOOD PRESSURE: 120 MMHG

## 2021-11-30 DIAGNOSIS — J06.9 UPPER RESPIRATORY TRACT INFECTION, UNSPECIFIED TYPE: Primary | ICD-10-CM

## 2021-11-30 PROCEDURE — 99213 PR OFFICE/OUTPT VISIT, EST, LEVL III, 20-29 MIN: ICD-10-PCS | Mod: S$GLB,,, | Performed by: FAMILY MEDICINE

## 2021-11-30 PROCEDURE — 99999 PR PBB SHADOW E&M-EST. PATIENT-LVL III: ICD-10-PCS | Mod: PBBFAC,,, | Performed by: FAMILY MEDICINE

## 2021-11-30 PROCEDURE — 99213 OFFICE O/P EST LOW 20 MIN: CPT | Mod: S$GLB,,, | Performed by: FAMILY MEDICINE

## 2021-11-30 PROCEDURE — 99999 PR PBB SHADOW E&M-EST. PATIENT-LVL III: CPT | Mod: PBBFAC,,, | Performed by: FAMILY MEDICINE

## 2021-11-30 RX ORDER — AZELASTINE 1 MG/ML
1 SPRAY, METERED NASAL 2 TIMES DAILY
Qty: 30 ML | Refills: 1 | Status: SHIPPED | OUTPATIENT
Start: 2021-11-30 | End: 2022-01-04

## 2021-11-30 RX ORDER — FLUTICASONE PROPIONATE 50 MCG
1 SPRAY, SUSPENSION (ML) NASAL DAILY
Qty: 11.1 G | Refills: 1 | Status: SHIPPED | OUTPATIENT
Start: 2021-11-30 | End: 2022-01-04

## 2022-06-03 ENCOUNTER — TELEPHONE (OUTPATIENT)
Dept: PRIMARY CARE CLINIC | Facility: CLINIC | Age: 62
End: 2022-06-03
Payer: COMMERCIAL

## 2022-06-03 NOTE — TELEPHONE ENCOUNTER
----- Message from Kiara Lyles sent at 6/3/2022  9:59 AM CDT -----  Contact: [phone]  Pt is calling she states she has been feeling dizzy and lightheaded and she is asking for some blood wok to be done please advise and give return call no soon appts available

## 2022-06-07 ENCOUNTER — OFFICE VISIT (OUTPATIENT)
Dept: PRIMARY CARE CLINIC | Facility: CLINIC | Age: 62
End: 2022-06-07
Payer: COMMERCIAL

## 2022-06-07 VITALS
TEMPERATURE: 98 F | HEART RATE: 68 BPM | HEIGHT: 67 IN | RESPIRATION RATE: 16 BRPM | WEIGHT: 167.56 LBS | BODY MASS INDEX: 26.3 KG/M2 | DIASTOLIC BLOOD PRESSURE: 78 MMHG | SYSTOLIC BLOOD PRESSURE: 110 MMHG | OXYGEN SATURATION: 97 %

## 2022-06-07 DIAGNOSIS — Z13.29 SCREENING FOR THYROID DISORDER: ICD-10-CM

## 2022-06-07 DIAGNOSIS — Z13.6 SCREENING FOR CARDIOVASCULAR CONDITION: ICD-10-CM

## 2022-06-07 DIAGNOSIS — R42 DIZZINESSES: ICD-10-CM

## 2022-06-07 DIAGNOSIS — G25.81 RESTLESS LEGS: Primary | ICD-10-CM

## 2022-06-07 DIAGNOSIS — Z98.84 HISTORY OF BARIATRIC SURGERY: ICD-10-CM

## 2022-06-07 PROCEDURE — 1159F PR MEDICATION LIST DOCUMENTED IN MEDICAL RECORD: ICD-10-PCS | Mod: CPTII,S$GLB,, | Performed by: FAMILY MEDICINE

## 2022-06-07 PROCEDURE — 3008F PR BODY MASS INDEX (BMI) DOCUMENTED: ICD-10-PCS | Mod: CPTII,S$GLB,, | Performed by: FAMILY MEDICINE

## 2022-06-07 PROCEDURE — 3074F SYST BP LT 130 MM HG: CPT | Mod: CPTII,S$GLB,, | Performed by: FAMILY MEDICINE

## 2022-06-07 PROCEDURE — 99999 PR PBB SHADOW E&M-EST. PATIENT-LVL IV: ICD-10-PCS | Mod: PBBFAC,,, | Performed by: FAMILY MEDICINE

## 2022-06-07 PROCEDURE — 99214 PR OFFICE/OUTPT VISIT, EST, LEVL IV, 30-39 MIN: ICD-10-PCS | Mod: S$GLB,,, | Performed by: FAMILY MEDICINE

## 2022-06-07 PROCEDURE — 3078F DIAST BP <80 MM HG: CPT | Mod: CPTII,S$GLB,, | Performed by: FAMILY MEDICINE

## 2022-06-07 PROCEDURE — 3008F BODY MASS INDEX DOCD: CPT | Mod: CPTII,S$GLB,, | Performed by: FAMILY MEDICINE

## 2022-06-07 PROCEDURE — 1159F MED LIST DOCD IN RCRD: CPT | Mod: CPTII,S$GLB,, | Performed by: FAMILY MEDICINE

## 2022-06-07 PROCEDURE — 3074F PR MOST RECENT SYSTOLIC BLOOD PRESSURE < 130 MM HG: ICD-10-PCS | Mod: CPTII,S$GLB,, | Performed by: FAMILY MEDICINE

## 2022-06-07 PROCEDURE — 99999 PR PBB SHADOW E&M-EST. PATIENT-LVL IV: CPT | Mod: PBBFAC,,, | Performed by: FAMILY MEDICINE

## 2022-06-07 PROCEDURE — 3078F PR MOST RECENT DIASTOLIC BLOOD PRESSURE < 80 MM HG: ICD-10-PCS | Mod: CPTII,S$GLB,, | Performed by: FAMILY MEDICINE

## 2022-06-07 PROCEDURE — 99214 OFFICE O/P EST MOD 30 MIN: CPT | Mod: S$GLB,,, | Performed by: FAMILY MEDICINE

## 2022-06-07 NOTE — PROGRESS NOTES
"Subjective:       Patient ID: Alexia Zaragoza is a 61 y.o. female.    Chief Complaint: Dizziness, Headache, and restless legs    Complaining of longstanding restless legs that wake her up from sleep at night, more R>L leg. As soon as she falls asleep her legs wake her up. Tossing and turning all night and exhausted all day.   Having some episodes of dizziness as well that is longstanding. Hx of bariatric surgery(gastric sleeve) and remarks that although she is not sure she remarks that it may occur more when she has not eaten for a while and improves after eating something. Denies any position that precipitates. Has been off BP meds since gastric sleeve surgery.     Dizziness:    Associated symptoms: headaches.  Headache   Associated symptoms include dizziness.     Review of Systems   Neurological: Positive for dizziness and headaches.       Objective:      Vitals:    06/07/22 1546   BP: 110/78   BP Location: Left arm   Patient Position: Sitting   BP Method: Medium (Manual)   Pulse: 68   Resp: 16   Temp: 98.3 °F (36.8 °C)   TempSrc: Oral   SpO2: 97%   Weight: 76 kg (167 lb 8.8 oz)   Height: 5' 7" (1.702 m)     Physical Exam  Vitals and nursing note reviewed.   Constitutional:       Appearance: She is well-developed.   HENT:      Head: Normocephalic and atraumatic.      Nose: Nose normal.   Eyes:      Conjunctiva/sclera: Conjunctivae normal.   Cardiovascular:      Rate and Rhythm: Normal rate and regular rhythm.      Heart sounds: Normal heart sounds.   Pulmonary:      Effort: Pulmonary effort is normal. No respiratory distress.      Breath sounds: Normal breath sounds.   Abdominal:      General: There is no distension.   Neurological:      Mental Status: She is alert.      Cranial Nerves: No cranial nerve deficit.             Lab Results   Component Value Date     05/10/2020    K 3.9 05/10/2020     05/10/2020    CO2 26 05/10/2020    BUN 14 05/10/2020    CREATININE 0.8 05/10/2020    ANIONGAP 10 " 05/10/2020     Lab Results   Component Value Date    HGBA1C 5.8 (H) 05/07/2020     Lab Results   Component Value Date    BNP 66 05/07/2020    BNP 29 05/06/2020       Lab Results   Component Value Date    WBC 8.49 05/10/2020    HGB 11.4 (L) 05/10/2020    HCT 35.3 (L) 05/10/2020     05/10/2020    GRAN 6.6 05/10/2020    GRAN 77.4 (H) 05/10/2020     Lab Results   Component Value Date    CHOL 199 03/09/2020    HDL 51 03/09/2020    LDLCALC 120 (H) 03/09/2020    TRIG 164 (H) 03/09/2020        No current outpatient medications on file.        Assessment:       1. Restless legs    2. History of bariatric surgery    3. Dizzinesses    4. Screening for cardiovascular condition    5. Screening for thyroid disorder           Plan:       Restless legs  -     Cancel: Iron and TIBC; Future; Expected date: 06/07/2022  -     Cancel: CBC Auto Differential; Future; Expected date: 06/07/2022  -     CBC Auto Differential; Future; Expected date: 06/07/2022  -     Iron and TIBC; Future; Expected date: 06/07/2022  History c/w restless leg syndrome. Getting iron panel. Encourage stretches.     History of bariatric surgery      Dizzinesses  -     Hemoglobin A1C; Future; Expected date: 06/07/2022  Transient dizziness with unclear etiology but chronic fatigue due to sleepless nights with restless legs. To keep log as may be precipitated by low BS. No syncope.     Screening for cardiovascular condition  -     Cancel: Comprehensive Metabolic Panel; Future; Expected date: 06/07/2022  -     Cancel: Hemoglobin A1C; Future; Expected date: 06/07/2022  -     Comprehensive Metabolic Panel; Future; Expected date: 06/07/2022  -     Hemoglobin A1C; Future; Expected date: 06/07/2022  -     TSH; Future; Expected date: 06/07/2022    Screening for thyroid disorder  -     Cancel: TSH; Future; Expected date: 06/07/2022  -     CBC Auto Differential; Future; Expected date: 06/07/2022

## 2022-06-09 LAB
ALBUMIN SERPL-MCNC: 4.4 G/DL (ref 3.6–5.1)
ALBUMIN/GLOB SERPL: 1.8 (CALC) (ref 1–2.5)
ALP SERPL-CCNC: 74 U/L (ref 37–153)
ALT SERPL-CCNC: 17 U/L (ref 6–29)
AST SERPL-CCNC: 17 U/L (ref 10–35)
BASOPHILS # BLD AUTO: 31 CELLS/UL (ref 0–200)
BASOPHILS NFR BLD AUTO: 0.6 %
BILIRUB SERPL-MCNC: 0.6 MG/DL (ref 0.2–1.2)
BUN SERPL-MCNC: 14 MG/DL (ref 7–25)
BUN/CREAT SERPL: ABNORMAL (CALC) (ref 6–22)
CALCIUM SERPL-MCNC: 9.5 MG/DL (ref 8.6–10.4)
CHLORIDE SERPL-SCNC: 107 MMOL/L (ref 98–110)
CO2 SERPL-SCNC: 28 MMOL/L (ref 20–32)
CREAT SERPL-MCNC: 0.79 MG/DL (ref 0.5–1.05)
EOSINOPHIL # BLD AUTO: 92 CELLS/UL (ref 15–500)
EOSINOPHIL NFR BLD AUTO: 1.8 %
ERYTHROCYTE [DISTWIDTH] IN BLOOD BY AUTOMATED COUNT: 13 % (ref 11–15)
GLOBULIN SER CALC-MCNC: 2.5 G/DL (CALC) (ref 1.9–3.7)
GLUCOSE SERPL-MCNC: 100 MG/DL (ref 65–99)
HBA1C MFR BLD: 5.9 % OF TOTAL HGB
HCT VFR BLD AUTO: 40.8 % (ref 35–45)
HGB BLD-MCNC: 13.5 G/DL (ref 11.7–15.5)
IRON SATN MFR SERPL: 23 % (CALC) (ref 16–45)
IRON SERPL-MCNC: 75 MCG/DL (ref 45–160)
LYMPHOCYTES # BLD AUTO: 1964 CELLS/UL (ref 850–3900)
LYMPHOCYTES NFR BLD AUTO: 38.5 %
MCH RBC QN AUTO: 30 PG (ref 27–33)
MCHC RBC AUTO-ENTMCNC: 33.1 G/DL (ref 32–36)
MCV RBC AUTO: 90.7 FL (ref 80–100)
MONOCYTES # BLD AUTO: 515 CELLS/UL (ref 200–950)
MONOCYTES NFR BLD AUTO: 10.1 %
NEUTROPHILS # BLD AUTO: 2499 CELLS/UL (ref 1500–7800)
NEUTROPHILS NFR BLD AUTO: 49 %
PLATELET # BLD AUTO: 251 THOUSAND/UL (ref 140–400)
PMV BLD REES-ECKER: 9.8 FL (ref 7.5–12.5)
POTASSIUM SERPL-SCNC: 4.1 MMOL/L (ref 3.5–5.3)
PROT SERPL-MCNC: 6.9 G/DL (ref 6.1–8.1)
RBC # BLD AUTO: 4.5 MILLION/UL (ref 3.8–5.1)
SODIUM SERPL-SCNC: 142 MMOL/L (ref 135–146)
TIBC SERPL-MCNC: 330 MCG/DL (CALC) (ref 250–450)
TSH SERPL-ACNC: 2.88 MIU/L (ref 0.4–4.5)
WBC # BLD AUTO: 5.1 THOUSAND/UL (ref 3.8–10.8)

## 2022-07-03 ENCOUNTER — TELEPHONE (OUTPATIENT)
Dept: PRIMARY CARE CLINIC | Facility: CLINIC | Age: 62
End: 2022-07-03
Payer: COMMERCIAL

## 2022-07-03 NOTE — TELEPHONE ENCOUNTER
Called patient and left message about need to follow up. Iron levels and blood count within normal range. Can discuss next steps in treatment for distressing leg condition/restless leg.

## 2022-07-07 ENCOUNTER — TELEPHONE (OUTPATIENT)
Dept: PRIMARY CARE CLINIC | Facility: CLINIC | Age: 62
End: 2022-07-07
Payer: COMMERCIAL

## 2022-07-07 NOTE — TELEPHONE ENCOUNTER
Called pt regarding results. Pt stated physician left message for a return call. Informed pt physician is requesting follow up visit and labs were within normal range. Pt verbalized understanding. Pt is now scheduled for 7/19/2022 for follow up visit. Pt also requested physician can check on constant ear pain.

## 2022-07-07 NOTE — TELEPHONE ENCOUNTER
----- Message from Renea Boothe sent at 7/7/2022  9:06 AM CDT -----  Contact: Self [phone]  Pt requesting a call in regards to lab results.    Please call and advise

## 2022-07-19 ENCOUNTER — OFFICE VISIT (OUTPATIENT)
Dept: PRIMARY CARE CLINIC | Facility: CLINIC | Age: 62
End: 2022-07-19
Payer: COMMERCIAL

## 2022-07-19 VITALS
BODY MASS INDEX: 25.73 KG/M2 | HEIGHT: 67 IN | RESPIRATION RATE: 19 BRPM | DIASTOLIC BLOOD PRESSURE: 80 MMHG | WEIGHT: 163.94 LBS | OXYGEN SATURATION: 97 % | SYSTOLIC BLOOD PRESSURE: 124 MMHG | HEART RATE: 77 BPM

## 2022-07-19 DIAGNOSIS — E61.1 IRON DEFICIENCY: ICD-10-CM

## 2022-07-19 DIAGNOSIS — G25.81 RESTLESS LEG: Primary | ICD-10-CM

## 2022-07-19 PROCEDURE — 99214 PR OFFICE/OUTPT VISIT, EST, LEVL IV, 30-39 MIN: ICD-10-PCS | Mod: S$GLB,,, | Performed by: FAMILY MEDICINE

## 2022-07-19 PROCEDURE — 3044F PR MOST RECENT HEMOGLOBIN A1C LEVEL <7.0%: ICD-10-PCS | Mod: CPTII,S$GLB,, | Performed by: FAMILY MEDICINE

## 2022-07-19 PROCEDURE — 3008F PR BODY MASS INDEX (BMI) DOCUMENTED: ICD-10-PCS | Mod: CPTII,S$GLB,, | Performed by: FAMILY MEDICINE

## 2022-07-19 PROCEDURE — 99999 PR PBB SHADOW E&M-EST. PATIENT-LVL IV: CPT | Mod: PBBFAC,,, | Performed by: FAMILY MEDICINE

## 2022-07-19 PROCEDURE — 3044F HG A1C LEVEL LT 7.0%: CPT | Mod: CPTII,S$GLB,, | Performed by: FAMILY MEDICINE

## 2022-07-19 PROCEDURE — 99214 OFFICE O/P EST MOD 30 MIN: CPT | Mod: S$GLB,,, | Performed by: FAMILY MEDICINE

## 2022-07-19 PROCEDURE — 3079F PR MOST RECENT DIASTOLIC BLOOD PRESSURE 80-89 MM HG: ICD-10-PCS | Mod: CPTII,S$GLB,, | Performed by: FAMILY MEDICINE

## 2022-07-19 PROCEDURE — 1159F PR MEDICATION LIST DOCUMENTED IN MEDICAL RECORD: ICD-10-PCS | Mod: CPTII,S$GLB,, | Performed by: FAMILY MEDICINE

## 2022-07-19 PROCEDURE — 1159F MED LIST DOCD IN RCRD: CPT | Mod: CPTII,S$GLB,, | Performed by: FAMILY MEDICINE

## 2022-07-19 PROCEDURE — 3074F SYST BP LT 130 MM HG: CPT | Mod: CPTII,S$GLB,, | Performed by: FAMILY MEDICINE

## 2022-07-19 PROCEDURE — 3074F PR MOST RECENT SYSTOLIC BLOOD PRESSURE < 130 MM HG: ICD-10-PCS | Mod: CPTII,S$GLB,, | Performed by: FAMILY MEDICINE

## 2022-07-19 PROCEDURE — 99999 PR PBB SHADOW E&M-EST. PATIENT-LVL IV: ICD-10-PCS | Mod: PBBFAC,,, | Performed by: FAMILY MEDICINE

## 2022-07-19 PROCEDURE — 3079F DIAST BP 80-89 MM HG: CPT | Mod: CPTII,S$GLB,, | Performed by: FAMILY MEDICINE

## 2022-07-19 PROCEDURE — 3008F BODY MASS INDEX DOCD: CPT | Mod: CPTII,S$GLB,, | Performed by: FAMILY MEDICINE

## 2022-07-19 RX ORDER — FERROUS SULFATE 325(65) MG
325 TABLET, DELAYED RELEASE (ENTERIC COATED) ORAL DAILY
Qty: 90 TABLET | Refills: 1 | Status: SHIPPED | OUTPATIENT
Start: 2022-07-19 | End: 2024-01-10

## 2022-07-19 RX ORDER — MULTIVIT WITH MINERALS/HERBS
1 TABLET ORAL DAILY
Qty: 90 TABLET | Refills: 0 | Status: SHIPPED | OUTPATIENT
Start: 2022-07-19 | End: 2024-01-10

## 2022-07-19 NOTE — PROGRESS NOTES
"Subjective:       Patient ID: Alexia Zaragoza is a 61 y.o. female.    Chief Complaint: Follow-up (Discuss labs )    Continues to have restless legs which is affecting her sleep. Denies taking any medications, no alcohol, and limited caffeine. Also is trying to cut down on sugar filled beverages.    If it's not her restless legs keeping her up its painful leg cramping which occurs on average several nights per week. This is longstanding. She is struggling to stay awake on her drive home due to poor sleep from this.     Review of Systems    Objective:      Vitals:    07/19/22 0916   BP: 124/80   BP Location: Left arm   Patient Position: Sitting   BP Method: Medium (Manual)   Pulse: 77   Resp: 19   SpO2: 97%   Weight: 74.3 kg (163 lb 14.6 oz)   Height: 5' 7" (1.702 m)     Physical Exam  Vitals and nursing note reviewed.   Constitutional:       Appearance: She is well-developed.   HENT:      Head: Normocephalic and atraumatic.      Nose: Nose normal.   Eyes:      Conjunctiva/sclera: Conjunctivae normal.   Cardiovascular:      Rate and Rhythm: Normal rate and regular rhythm.      Heart sounds: Normal heart sounds.   Pulmonary:      Effort: Pulmonary effort is normal. No respiratory distress.   Abdominal:      General: There is no distension.   Neurological:      Mental Status: She is alert.      Cranial Nerves: No cranial nerve deficit.             Lab Results   Component Value Date     06/08/2022    K 4.1 06/08/2022     06/08/2022    CO2 28 06/08/2022    BUN 14 06/08/2022    CREATININE 0.79 06/08/2022    ANIONGAP 10 05/10/2020     Lab Results   Component Value Date    HGBA1C 5.9 (H) 06/08/2022     Lab Results   Component Value Date    BNP 66 05/07/2020    BNP 29 05/06/2020       Lab Results   Component Value Date    WBC 5.1 06/08/2022    HGB 13.5 06/08/2022    HCT 40.8 06/08/2022     06/08/2022    GRAN 6.6 05/10/2020    GRAN 77.4 (H) 05/10/2020     Lab Results   Component Value Date    CHOL 199 " 03/09/2020    HDL 51 03/09/2020    LDLCALC 120 (H) 03/09/2020    TRIG 164 (H) 03/09/2020          Current Outpatient Medications:     b complex vitamins tablet, Take 1 tablet by mouth once daily., Disp: 90 tablet, Rfl: 0    ferrous sulfate 325 (65 FE) MG EC tablet, Take 1 tablet (325 mg total) by mouth once daily., Disp: 90 tablet, Rfl: 1        Assessment:       1. Restless leg    2. Iron deficiency           Plan:       Restless leg  -     ferrous sulfate 325 (65 FE) MG EC tablet; Take 1 tablet (325 mg total) by mouth once daily.  Dispense: 90 tablet; Refill: 1  Restless leg syndrome along with what sound like nocturnal leg cramps which are chronic. Reviewed stretching,heating pads, hot water bath soaks. Her serum iron stores are borderline. Trial of iron pills. Also b complex. She is wishing to avoid sedating meds. Trial before starting dopamine agonist.     Iron deficiency  -     ferrous sulfate 325 (65 FE) MG EC tablet; Take 1 tablet (325 mg total) by mouth once daily.  Dispense: 90 tablet; Refill: 1  -     b complex vitamins tablet; Take 1 tablet by mouth once daily.  Dispense: 90 tablet; Refill: 0  As above

## 2023-06-22 DIAGNOSIS — Z12.31 OTHER SCREENING MAMMOGRAM: ICD-10-CM

## 2023-06-27 ENCOUNTER — PATIENT MESSAGE (OUTPATIENT)
Dept: ADMINISTRATIVE | Facility: HOSPITAL | Age: 63
End: 2023-06-27
Payer: COMMERCIAL

## 2023-08-02 ENCOUNTER — HOSPITAL ENCOUNTER (OUTPATIENT)
Dept: RADIOLOGY | Facility: CLINIC | Age: 63
Discharge: HOME | End: 2023-08-02
Attending: FAMILY MEDICINE
Payer: COMMERCIAL

## 2023-08-02 VITALS — WEIGHT: 163 LBS | BODY MASS INDEX: 25.53 KG/M2

## 2023-08-02 DIAGNOSIS — Z12.31 OTHER SCREENING MAMMOGRAM: ICD-10-CM

## 2023-08-02 PROCEDURE — 77067 SCR MAMMO BI INCL CAD: CPT | Mod: 26,,, | Performed by: RADIOLOGY

## 2023-08-02 PROCEDURE — 77063 MAMMO DIGITAL SCREENING BILAT WITH TOMO: ICD-10-PCS | Mod: 26,,, | Performed by: RADIOLOGY

## 2023-08-02 PROCEDURE — 77067 SCR MAMMO BI INCL CAD: CPT | Mod: TC,PO

## 2023-08-02 PROCEDURE — 77063 BREAST TOMOSYNTHESIS BI: CPT | Mod: 26,,, | Performed by: RADIOLOGY

## 2023-08-02 PROCEDURE — 77067 MAMMO DIGITAL SCREENING BILAT WITH TOMO: ICD-10-PCS | Mod: 26,,, | Performed by: RADIOLOGY

## 2024-01-10 ENCOUNTER — OFFICE VISIT (OUTPATIENT)
Dept: PRIMARY CARE CLINIC | Facility: CLINIC | Age: 64
End: 2024-01-10
Payer: COMMERCIAL

## 2024-01-10 VITALS
DIASTOLIC BLOOD PRESSURE: 86 MMHG | WEIGHT: 163.5 LBS | RESPIRATION RATE: 18 BRPM | HEIGHT: 67 IN | HEART RATE: 68 BPM | OXYGEN SATURATION: 98 % | SYSTOLIC BLOOD PRESSURE: 132 MMHG | TEMPERATURE: 98 F | BODY MASS INDEX: 25.66 KG/M2

## 2024-01-10 DIAGNOSIS — Z23 NEED FOR VACCINATION: ICD-10-CM

## 2024-01-10 DIAGNOSIS — E78.2 MIXED HYPERLIPIDEMIA: ICD-10-CM

## 2024-01-10 DIAGNOSIS — R07.89 ATYPICAL CHEST PAIN: Primary | ICD-10-CM

## 2024-01-10 DIAGNOSIS — R73.03 PREDIABETES: ICD-10-CM

## 2024-01-10 PROBLEM — R79.89 ELEVATED LFTS: Status: RESOLVED | Noted: 2020-05-07 | Resolved: 2024-01-10

## 2024-01-10 PROBLEM — I10 ESSENTIAL HYPERTENSION, BENIGN: Status: RESOLVED | Noted: 2019-07-02 | Resolved: 2024-01-10

## 2024-01-10 PROBLEM — F41.0 GENERALIZED ANXIETY DISORDER WITH PANIC ATTACKS: Status: RESOLVED | Noted: 2020-08-31 | Resolved: 2024-01-10

## 2024-01-10 PROBLEM — F41.1 GENERALIZED ANXIETY DISORDER WITH PANIC ATTACKS: Status: RESOLVED | Noted: 2020-08-31 | Resolved: 2024-01-10

## 2024-01-10 PROBLEM — K80.50 CHOLEDOCHOLITHIASIS: Status: RESOLVED | Noted: 2020-05-07 | Resolved: 2024-01-10

## 2024-01-10 PROCEDURE — 3075F SYST BP GE 130 - 139MM HG: CPT | Mod: CPTII,S$GLB,, | Performed by: FAMILY MEDICINE

## 2024-01-10 PROCEDURE — 99214 OFFICE O/P EST MOD 30 MIN: CPT | Mod: S$GLB,,, | Performed by: FAMILY MEDICINE

## 2024-01-10 PROCEDURE — G0008 ADMIN INFLUENZA VIRUS VAC: HCPCS | Mod: S$GLB,,, | Performed by: FAMILY MEDICINE

## 2024-01-10 PROCEDURE — 90686 IIV4 VACC NO PRSV 0.5 ML IM: CPT | Mod: S$GLB,,, | Performed by: FAMILY MEDICINE

## 2024-01-10 PROCEDURE — 93005 ELECTROCARDIOGRAM TRACING: CPT | Mod: S$GLB,,, | Performed by: FAMILY MEDICINE

## 2024-01-10 PROCEDURE — 3008F BODY MASS INDEX DOCD: CPT | Mod: CPTII,S$GLB,, | Performed by: FAMILY MEDICINE

## 2024-01-10 PROCEDURE — 93010 ELECTROCARDIOGRAM REPORT: CPT | Mod: S$GLB,,, | Performed by: INTERNAL MEDICINE

## 2024-01-10 PROCEDURE — 1159F MED LIST DOCD IN RCRD: CPT | Mod: CPTII,S$GLB,, | Performed by: FAMILY MEDICINE

## 2024-01-10 PROCEDURE — 1160F RVW MEDS BY RX/DR IN RCRD: CPT | Mod: CPTII,S$GLB,, | Performed by: FAMILY MEDICINE

## 2024-01-10 PROCEDURE — 3079F DIAST BP 80-89 MM HG: CPT | Mod: CPTII,S$GLB,, | Performed by: FAMILY MEDICINE

## 2024-01-10 PROCEDURE — 99999 PR PBB SHADOW E&M-EST. PATIENT-LVL IV: CPT | Mod: PBBFAC,,, | Performed by: FAMILY MEDICINE

## 2024-01-10 NOTE — PROGRESS NOTES
"Subjective:       Patient ID: Alexia Wolfe is a 63 y.o. female.    Chief Complaint: Chest Pain (Started 1/7) and Neck Pain    Has had a few episodes of diffuse chest pain wrapping around her back and radiating up to her neck over the past few months. Sometimes feels nauseated, but no vomiting. Most recent episode was also associated with diaphoresis. Pain usually only lasts for a few seconds. No identified triggers, goes away on its own. Does have FHx of heart disease.      Review of Systems   Constitutional:  Negative for fever.   HENT:  Negative for trouble swallowing.    Respiratory:  Negative for shortness of breath.    Cardiovascular:  Positive for chest pain and palpitations (occasional).   Gastrointestinal:  Positive for nausea. Negative for vomiting.   Musculoskeletal:  Positive for neck pain.   Allergic/Immunologic: Negative for immunocompromised state.   Neurological:  Negative for dizziness and light-headedness.       Objective:      Vitals:    01/10/24 1242   BP: 132/86   BP Location: Right arm   Patient Position: Sitting   BP Method: Medium (Manual)   Pulse: 68   Resp: 18   Temp: 97.6 °F (36.4 °C)   TempSrc: Temporal   SpO2: 98%   Weight: 74.2 kg (163 lb 7.5 oz)   Height: 5' 7" (1.702 m)     BP Readings from Last 5 Encounters:   01/10/24 132/86   07/19/22 124/80   06/07/22 110/78   11/30/21 120/82   11/12/21 122/84     Wt Readings from Last 5 Encounters:   01/10/24 74.2 kg (163 lb 7.5 oz)   08/02/23 73.9 kg (163 lb)   07/19/22 74.3 kg (163 lb 14.6 oz)   06/07/22 76 kg (167 lb 8.8 oz)   11/30/21 75.1 kg (165 lb 9.1 oz)     Physical Exam  Vitals and nursing note reviewed.   Constitutional:       General: She is not in acute distress.     Appearance: Normal appearance. She is well-developed.   HENT:      Head: Normocephalic and atraumatic.   Neck:      Vascular: No carotid bruit.   Cardiovascular:      Rate and Rhythm: Normal rate and regular rhythm.      Heart sounds: Normal heart sounds. "   Pulmonary:      Effort: Pulmonary effort is normal.      Breath sounds: Normal breath sounds.   Abdominal:      General: Bowel sounds are normal. There is no distension.      Tenderness: There is no abdominal tenderness.   Musculoskeletal:      Right lower leg: No edema.      Left lower leg: No edema.   Skin:     General: Skin is warm and dry.   Neurological:      Mental Status: She is alert and oriented to person, place, and time.   Psychiatric:         Mood and Affect: Mood normal.         Behavior: Behavior normal.         Lab Results   Component Value Date    WBC 5.1 06/08/2022    HGB 13.5 06/08/2022    HCT 40.8 06/08/2022     06/08/2022    CHOL 199 03/09/2020    TRIG 164 (H) 03/09/2020    HDL 51 03/09/2020    ALT 17 06/08/2022    AST 17 06/08/2022     06/08/2022    K 4.1 06/08/2022     06/08/2022    CREATININE 0.79 06/08/2022    BUN 14 06/08/2022    CO2 28 06/08/2022    TSH 2.88 06/08/2022    HGBA1C 5.9 (H) 06/08/2022      Assessment:       1. Atypical chest pain    2. Need for vaccination    3. Prediabetes    4. Mixed hyperlipidemia        Plan:       Atypical chest pain  -     EKG 12-lead  -     TSH; Future; Expected date: 01/10/2024  -     Exercise Stress - EKG; Future  No acute EKG changes.  Given family history, needs further risk stratification.  Need for vaccination  -     Influenza - Quadrivalent *Preferred* (6 months+) (PF)    Prediabetes  -     Hemoglobin A1C; Future; Expected date: 01/10/2024    Mixed hyperlipidemia  -     CBC Auto Differential; Future; Expected date: 01/10/2024  -     Comprehensive Metabolic Panel; Future; Expected date: 01/10/2024  -     Lipid Panel; Future; Expected date: 01/10/2024  -     TSH; Future; Expected date: 01/10/2024      Medication List with Changes/Refills   Current Medications    DOCOSAHEXAENOIC ACID/EPA (FISH OIL ORAL)    Take by mouth.   Discontinued Medications    B COMPLEX VITAMINS TABLET    Take 1 tablet by mouth once daily.    FERROUS  SULFATE 325 (65 FE) MG EC TABLET    Take 1 tablet (325 mg total) by mouth once daily.

## 2024-04-04 DIAGNOSIS — E78.2 MIXED HYPERLIPIDEMIA: ICD-10-CM

## 2024-04-04 NOTE — TELEPHONE ENCOUNTER
No care due was identified.  Health system Embedded Care Due Messages. Reference number: [id number].   4/04/2024 9:30:57 AM CDT

## 2024-04-04 NOTE — TELEPHONE ENCOUNTER
----- Message from Radha Hidalgo sent at 4/4/2024  9:03 AM CDT -----  Contact: [phone]  Requesting an RX refill or new RX.  Is this a refill or new RX: refill   RX name and strength (copy/paste from chart):  rosuvastatin (CRESTOR) 10 MG tablet 90 tablet  Is this a 30 day or 90 day RX: 90 day   Pharmacy name and phone # (copy/paste from chart):    [id number] - [place], [place] [facility] [address] [facility] [place] [address]  [address]  [place] [address]  Phone: [phone] Fax: [phone]    Per pt, she has about a week left of medication.     The doctors have asked that we provide their patients with the following 2 reminders -- prescription refills can take up to 72 hours, and a friendly reminder that in the future you can use your [facility] account to request refills: pt is aware            Thank you

## 2024-04-05 RX ORDER — ROSUVASTATIN CALCIUM 10 MG/1
10 TABLET, COATED ORAL DAILY
Qty: 90 TABLET | Refills: 3 | Status: SHIPPED | OUTPATIENT
Start: 2024-04-05

## 2024-04-05 NOTE — TELEPHONE ENCOUNTER
Refill Routing Note   Medication(s) are not appropriate for processing by [facility] for the following reason(s):        New or recently adjusted medication:     [facility] action(s):  Defer      Medication Therapy Plan:         Appointments  past 12m or future 3m with PCP    Date Provider   Last Visit   1/10/2024 Hugo Franco MD   Next Visit   Visit date not found Hugo Franco MD   ED visits in past 90 days: 0        Note composed:11:11 AM 04/05/2024

## 2024-04-25 ENCOUNTER — OFFICE VISIT (OUTPATIENT)
Dept: DERMATOLOGY | Facility: CLINIC | Age: 64
End: 2024-04-25
Payer: COMMERCIAL

## 2024-04-25 DIAGNOSIS — L57.0 AK (ACTINIC KERATOSIS): ICD-10-CM

## 2024-04-25 DIAGNOSIS — D18.01 CHERRY ANGIOMA: ICD-10-CM

## 2024-04-25 DIAGNOSIS — L82.1 SEBORRHEIC KERATOSIS: ICD-10-CM

## 2024-04-25 DIAGNOSIS — L81.4 LENTIGO: ICD-10-CM

## 2024-04-25 DIAGNOSIS — L73.8 SEBACEOUS HYPERPLASIA: ICD-10-CM

## 2024-04-25 DIAGNOSIS — D22.9 MULTIPLE BENIGN NEVI: Primary | ICD-10-CM

## 2024-04-25 DIAGNOSIS — L57.8 ACTINIC SKIN DAMAGE: ICD-10-CM

## 2024-04-25 PROCEDURE — 99213 OFFICE O/P EST LOW 20 MIN: CPT | Mod: 25,S$GLB,, | Performed by: STUDENT IN AN ORGANIZED HEALTH CARE EDUCATION/TRAINING PROGRAM

## 2024-04-25 PROCEDURE — 3044F HG A1C LEVEL LT 7.0%: CPT | Mod: CPTII,S$GLB,, | Performed by: STUDENT IN AN ORGANIZED HEALTH CARE EDUCATION/TRAINING PROGRAM

## 2024-04-25 PROCEDURE — 17000 DESTRUCT PREMALG LESION: CPT | Mod: S$GLB,,, | Performed by: STUDENT IN AN ORGANIZED HEALTH CARE EDUCATION/TRAINING PROGRAM

## 2024-04-25 PROCEDURE — 1160F RVW MEDS BY RX/DR IN RCRD: CPT | Mod: CPTII,S$GLB,, | Performed by: STUDENT IN AN ORGANIZED HEALTH CARE EDUCATION/TRAINING PROGRAM

## 2024-04-25 PROCEDURE — 1159F MED LIST DOCD IN RCRD: CPT | Mod: CPTII,S$GLB,, | Performed by: STUDENT IN AN ORGANIZED HEALTH CARE EDUCATION/TRAINING PROGRAM

## 2024-04-25 NOTE — PATIENT INSTRUCTIONS

## 2024-04-25 NOTE — PROGRESS NOTES
Subjective:      Patient ID:  Alexia Wolfe is a 63 y.o. female who presents for   Chief Complaint   Patient presents with    Skin Check     UBSC     LOV 06/08/2021 (Tyrese)    Patient is coming in today for UBSC. States she has multiple spots on back, chest, and face. She wants to discuss getting some of them removed.     Derm Hx  Denies Phx NMSC  Denies Fhx MM      Review of Systems   Constitutional:  Negative for fever, chills and fatigue.   Respiratory:  Negative for cough and shortness of breath.    Gastrointestinal:  Negative for nausea, vomiting and diarrhea.   Skin:  Positive for activity-related sunscreen use and wears hat. Negative for daily sunscreen use.   Hematologic/Lymphatic: Bruises/bleeds easily (fish oil).       Objective:   Physical Exam   Constitutional: She appears well-developed and well-nourished. No distress.   Neurological: She is alert and oriented to person, place, and time. She is not disoriented.   Psychiatric: She has a normal mood and affect.   Skin:   Areas Examined (abnormalities noted in diagram):   Scalp / Hair Palpated and Inspected  Head / Face Inspection Performed  Neck Inspection Performed  Chest / Axilla Inspection Performed  Abdomen Inspection Performed  Back Inspection Performed  RUE Inspected  LUE Inspection Performed  Nails and Digits Inspection Performed                 Diagram Legend     Erythematous scaling macule/papule c/w actinic keratosis       Vascular papule c/w angioma      Pigmented verrucoid papule/plaque c/w seborrheic keratosis      Yellow umbilicated papule c/w sebaceous hyperplasia      Irregularly shaped tan macule c/w lentigo     1-2 mm smooth white papules consistent with Milia      Movable subcutaneous cyst with punctum c/w epidermal inclusion cyst      Subcutaneous movable cyst c/w pilar cyst      Firm pink to brown papule c/w dermatofibroma      Pedunculated fleshy papule(s) c/w skin tag(s)      Evenly pigmented macule c/w junctional nevus      Mildly variegated pigmented, slightly irregular-bordered macule c/w mildly atypical nevus      Flesh colored to evenly pigmented papule c/w intradermal nevus       Pink pearly papule/plaque c/w basal cell carcinoma      Erythematous hyperkeratotic cursted plaque c/w SCC      Surgical scar with no sign of skin cancer recurrence      Open and closed comedones      Inflammatory papules and pustules      Verrucoid papule consistent consistent with wart     Erythematous eczematous patches and plaques     Dystrophic onycholytic nail with subungual debris c/w onychomycosis     Umbilicated papule    Erythematous-base heme-crusted tan verrucoid plaque consistent with inflamed seborrheic keratosis     Erythematous Silvery Scaling Plaque c/w Psoriasis     See annotation      Assessment / Plan:        Multiple benign nevi  Careful dermoscopy evaluation of nevi performed with none identified as needing biopsy today  Monitor for new mole or moles that are becoming bigger, darker, irritated, or developing irregular borders.     Lentigo  This is a benign hyperpigmented sun induced lesion. Daily sun protection will reduce the number of new lesions. Treatment of these benign lesions are considered cosmetic.  Recommend IPL    Seborrheic keratosis  These are benign inherited growths without a malignant potential. Reassurance given to patient. No treatment is necessary.   Price sheet for removal given    Cherry angioma  This is a benign vascular lesion. Reassurance given. No treatment required.     Sebaceous hyperplasia  This is a common condition representing benign enlargement of the sebaceous lobule. It typically occurs in adulthood. Reassurance given to patient.     Actinic skin damage  Upper body skin examination performed today including at least 6 points as noted in physical examination. No lesions suspicious for malignancy noted.  Patient instructed in importance in daily broad spectrum sun protection of at least spf 30.  Mineral sunscreen ingredients preferred (Zinc +/- Titanium) and can be found OTC.   Patient encouraged to wear hat for all outdoor exposure.   Also discussed sun avoidance and use of protective clothing.    AK (actinic keratosis)  Cryosurgery Procedure Note    Verbal consent from the patient is obtained and the patient is aware of the precancerous quality and need for treatment of these lesions. Liquid nitrogen cryosurgery is applied to the 1 actinic keratoses, as detailed in the physical exam, to produce a freeze injury. The patient is aware that blisters may form and is instructed on wound care with gentle cleansing and use of vaseline ointment to keep moist until healed. The patient is supplied a handout on cryosurgery and is instructed to call if lesions do not completely resolve.         1 year    No follow-ups on file.

## 2024-05-20 ENCOUNTER — PROCEDURE VISIT (OUTPATIENT)
Dept: DERMATOLOGY | Facility: CLINIC | Age: 64
End: 2024-05-20

## 2024-05-20 DIAGNOSIS — L82.1 SEBORRHEIC KERATOSIS: Primary | ICD-10-CM

## 2024-05-20 PROCEDURE — 99499 UNLISTED E&M SERVICE: CPT | Mod: S$GLB,,, | Performed by: STUDENT IN AN ORGANIZED HEALTH CARE EDUCATION/TRAINING PROGRAM

## 2024-05-20 PROCEDURE — 17110 DESTRUCTION B9 LES UP TO 14: CPT | Mod: CSM,S$GLB,, | Performed by: STUDENT IN AN ORGANIZED HEALTH CARE EDUCATION/TRAINING PROGRAM

## 2024-05-20 NOTE — PROGRESS NOTES
Subjective:      Patient ID:  Alexia Wolfe is a 63 y.o. female who presents for   Chief Complaint   Patient presents with    Seborrheic Keratosis     Patient here today for SK removal.        Review of Systems   Constitutional:  Negative for fever, chills and fatigue.   Respiratory:  Negative for cough and shortness of breath.    Gastrointestinal:  Negative for nausea, vomiting and diarrhea.   Skin:  Positive for activity-related sunscreen use and wears hat. Negative for daily sunscreen use.   Hematologic/Lymphatic: Bruises/bleeds easily (fish oil).       Objective:   Physical Exam   Constitutional: She appears well-developed and well-nourished.   Neurological: She is alert and oriented to person, place, and time.   Psychiatric: She has a normal mood and affect.   Skin:   Areas Examined (abnormalities noted in diagram):   Head / Face Inspection Performed  Neck Inspection Performed            Diagram Legend     Erythematous scaling macule/papule c/w actinic keratosis       Vascular papule c/w angioma      Pigmented verrucoid papule/plaque c/w seborrheic keratosis      Yellow umbilicated papule c/w sebaceous hyperplasia      Irregularly shaped tan macule c/w lentigo     1-2 mm smooth white papules consistent with Milia      Movable subcutaneous cyst with punctum c/w epidermal inclusion cyst      Subcutaneous movable cyst c/w pilar cyst      Firm pink to brown papule c/w dermatofibroma      Pedunculated fleshy papule(s) c/w skin tag(s)      Evenly pigmented macule c/w junctional nevus     Mildly variegated pigmented, slightly irregular-bordered macule c/w mildly atypical nevus      Flesh colored to evenly pigmented papule c/w intradermal nevus       Pink pearly papule/plaque c/w basal cell carcinoma      Erythematous hyperkeratotic cursted plaque c/w SCC      Surgical scar with no sign of skin cancer recurrence      Open and closed comedones      Inflammatory papules and pustules      Verrucoid papule  consistent consistent with wart     Erythematous eczematous patches and plaques     Dystrophic onycholytic nail with subungual debris c/w onychomycosis     Umbilicated papule    Erythematous-base heme-crusted tan verrucoid plaque consistent with inflamed seborrheic keratosis     Erythematous Silvery Scaling Plaque c/w Psoriasis     See annotation      Assessment / Plan:        Seborrheic keratosis  Cryosurgery procedure note:    Verbal consent from the patient is obtained. Liquid nitrogen cryosurgery is applied to 3 lesions to produce a freeze injury. The patient is aware that blisters may form and is instructed on wound care with gentle cleansing and use of vaseline ointment to keep moist until healed. The patient is supplied a handout on cryosurgery and is instructed to call if lesions do not completely resolve.  Here for cosmetic destruction of seborrheic keratoses.    Procedure note for destruction via hyfrecation and curettage:    Verbal consent obtained. Risks of recurrence and scarring discussed.   2 lesions cleaned with alcohol and anesthetized with 1% lidocaine with epinephrine. Areas then lightly hyfrecated and curetted to remove gross lesion. Hemostasis achieved with aluminum chloride application. No complications.   Areas dressed with Vaseline jelly and bandage. Wound care discussed.    Pt tolerated well    F/u prn           No follow-ups on file.

## 2024-05-20 NOTE — PATIENT INSTRUCTIONS

## 2024-08-15 DIAGNOSIS — Z12.31 OTHER SCREENING MAMMOGRAM: ICD-10-CM

## 2024-09-05 ENCOUNTER — HOSPITAL ENCOUNTER (OUTPATIENT)
Dept: RADIOLOGY | Facility: CLINIC | Age: 64
Discharge: HOME | End: 2024-09-05
Attending: FAMILY MEDICINE
Payer: COMMERCIAL

## 2024-09-05 DIAGNOSIS — Z12.31 OTHER SCREENING MAMMOGRAM: ICD-10-CM

## 2024-09-05 PROCEDURE — 77063 BREAST TOMOSYNTHESIS BI: CPT | Mod: 26,,, | Performed by: RADIOLOGY

## 2024-09-05 PROCEDURE — 77067 SCR MAMMO BI INCL CAD: CPT | Mod: 26,,, | Performed by: RADIOLOGY

## 2024-09-05 PROCEDURE — 77067 SCR MAMMO BI INCL CAD: CPT | Mod: TC,PO

## 2024-09-19 ENCOUNTER — PATIENT MESSAGE (OUTPATIENT)
Dept: PRIMARY CARE CLINIC | Facility: CLINIC | Age: 64
End: 2024-09-19
Payer: COMMERCIAL

## 2024-10-04 ENCOUNTER — OFFICE VISIT (OUTPATIENT)
Dept: PRIMARY CARE CLINIC | Facility: CLINIC | Age: 64
End: 2024-10-04
Payer: COMMERCIAL

## 2024-10-04 VITALS
WEIGHT: 163.38 LBS | HEIGHT: 67 IN | HEART RATE: 66 BPM | OXYGEN SATURATION: 95 % | SYSTOLIC BLOOD PRESSURE: 128 MMHG | DIASTOLIC BLOOD PRESSURE: 86 MMHG | RESPIRATION RATE: 18 BRPM | BODY MASS INDEX: 25.64 KG/M2 | TEMPERATURE: 98 F

## 2024-10-04 DIAGNOSIS — R73.03 PREDIABETES: ICD-10-CM

## 2024-10-04 DIAGNOSIS — Z12.12 ENCOUNTER FOR COLORECTAL CANCER SCREENING: ICD-10-CM

## 2024-10-04 DIAGNOSIS — M25.50 ARTHRALGIA OF MULTIPLE SITES: ICD-10-CM

## 2024-10-04 DIAGNOSIS — Z00.00 ANNUAL PHYSICAL EXAM: Primary | ICD-10-CM

## 2024-10-04 DIAGNOSIS — M72.2 PLANTAR FASCIITIS, LEFT: ICD-10-CM

## 2024-10-04 DIAGNOSIS — Z23 NEED FOR VACCINATION: ICD-10-CM

## 2024-10-04 DIAGNOSIS — Z12.11 ENCOUNTER FOR COLORECTAL CANCER SCREENING: ICD-10-CM

## 2024-10-04 DIAGNOSIS — K21.9 GASTROESOPHAGEAL REFLUX DISEASE, UNSPECIFIED WHETHER ESOPHAGITIS PRESENT: ICD-10-CM

## 2024-10-04 DIAGNOSIS — E78.2 MIXED HYPERLIPIDEMIA: ICD-10-CM

## 2024-10-04 PROCEDURE — 99999 PR PBB SHADOW E&M-EST. PATIENT-LVL III: CPT | Mod: PBBFAC,,, | Performed by: FAMILY MEDICINE

## 2024-10-04 RX ORDER — PANTOPRAZOLE SODIUM 40 MG/1
40 TABLET, DELAYED RELEASE ORAL DAILY
Qty: 90 TABLET | Refills: 0 | Status: SHIPPED | OUTPATIENT
Start: 2024-10-04 | End: 2025-01-02

## 2024-10-04 NOTE — PROGRESS NOTES
Verified pt by name and . NKDA. Per physician orders pt was administered Influenza IM to left deltoid using aseptic technique. Pt tolerated well. No adverse effects or pain reported. MD notified.

## 2024-10-04 NOTE — PROGRESS NOTES
"Patient ID: Alexia Wolfe is a 64 y.o. female.    Chief Complaint: Annual Exam    History of Present Illness    CHIEF COMPLAINT:  Patient presents today for left thumb pain and foot pain.    HAND AND FOOT PAIN:  She reports pain and locking in her left thumb, particularly at night, with a burning sensation radiating down her arm. She has difficulty making a fist and stretches her thumb when the burning occurs. The symptoms come and go, sometimes waking her at night. She denies specific injury and has not used braces or ice. Similar issues are reported in her right thumb and left pinky finger. She exercises her hands in the morning to alleviate stiffness and discomfort.    She also reports left foot pain, primarily in the arch, worsening with initial steps after rest. The pain improves after walking but can be severe enough to impair initial walking. She describes it as feeling like "it's just ripping". She has a history of toe numbness, for which she previously consulted a podiatrist and received an injection.    GASTROINTESTINAL ISSUES:  She reports gas pains in her chest attributed to her hernia, with difficulty belching and passing gas. She experiences indigestion and heartburn, worsening after eating, for approximately four months. The pain wraps around her ribs, often feeling like a heart attack. She experiences associated nausea and vomiting, feeling better after vomiting and having a bowel movement. She denies choking or trouble swallowing. Gas-X has not provided relief, while occasional use of antacids with milk offers temporary relief. Dietary changes have reduced acid reflux frequency, but she still experiences symptoms almost daily.    MEDICAL HISTORY:  She has a history of gastric sleeve surgery, partial colon removal, and multiple hernia surgeries. She is prediabetic with high cholesterol and triglycerides. She is due for a colonoscopy, expressing concern about the preparation due to difficulties " from her gastric sleeve surgery. During her last colonoscopy, she experienced issues with the preparation solution, including vomiting and rapid bowel movements.    MEDICATIONS:  She takes Crestor for cholesterol and triglycerides, started earlier this year. She occasionally uses acid reflux medication with milk for temporary relief.    LIFESTYLE:  She is constantly active at home, engaging in frequent walking and household activities throughout the day.    FAMILY HISTORY:  Her  frequently advises her on proper footwear, emphasizing the importance of good arch support.      ROS:  General: -fever, -chills, -fatigue, -weight gain, -weight loss  Eyes: -vision changes, -redness, -discharge  ENT: -ear pain, -nasal congestion, -sore throat  Cardiovascular: -chest pain, -palpitations, -lower extremity edema  Respiratory: -cough, -shortness of breath  Gastrointestinal: -abdominal pain, +nausea, +vomiting, -diarrhea, -constipation, -blood in stool, +heartburn  Genitourinary: -dysuria, -hematuria, -frequency  Musculoskeletal: +joint pain, +muscle pain, +joint stiffness  Skin: -rash, -lesion  Neurological: -headache, -dizziness, +numbness, -tingling  Psychiatric: -anxiety, -depression, -sleep difficulty         Physical Exam    General: Well-developed. Well-nourished. No acute distress.  Eyes: EOMI. Sclerae anicteric.  HENT: Normocephalic. Atraumatic. Nares patent. Moist oral mucosa.  Cardiovascular: Regular rate. Regular rhythm. No murmurs. No rubs. No gallops. Normal S1, S2.  Respiratory: Normal respiratory effort. Clear to auscultation bilaterally. No rales. No rhonchi. No wheezing. Pain on deep breath.  Musculoskeletal: No  obvious deformity.  Extremities: No lower extremity edema.  Neurological: Alert & oriented x3. No slurred speech. Normal gait.  Psychiatric: Normal mood. Normal affect. Good insight. Good judgment.  Skin: Warm. Dry. No rash.  MSK: Foot/Ankle - Left: Tenderness through the arch of left foot.  MSK:  Hand/Wrist - Right: Trigger finger in right thumb.  MSK: Hand/Wrist - Left: Trigger finger in left pinky.         Assessment & Plan    - Assessed left thumb and right pinky for trigger finger symptoms  - Evaluated left foot pain, likely plantar fasciitis  - Considered patient's history of gastric sleeve surgery and hernia in relation to reported gas pains and reflux symptoms  - Reviewed patient's prediabetic status and need for lipid management  - Determined need for inflammatory marker testing due to joint pain complaints    PLANTAR FASCIITIS:   Explained plantar fasciitis and its typical symptoms.   Discussed the importance of proper footwear with good arch support for plantar fasciitis management.   Patient to wear shoes with good arch support, including when getting up at night.   Patient to use frozen water bottle to roll under foot arch for plantar fasciitis relief.    TRIGGER FINGER:   Provided information on trigger finger and potential treatment options.    ACID REFLUX:   Explained the relationship between diet and acid reflux symptoms.   Recommend maintaining a clean diet to help manage reflux symptoms.   Started pantoprazole 40 mg tablet daily for 90 days to manage reflux symptoms.    FLU VACCINATION:   Flu shot administered by nurse.    LABS:   Ordered labs including A1C, sed rate, C-reactive protein, and rheumatoid factor.   Lab will reach out to schedule labs.    COLONOSCOPY REFERRAL:   Referred for colonoscopy.   Colonoscopy scheduling team will contact patient to arrange appointment.            Follow up in about 1 year (around 10/4/2025) for annual physical.    This note was generated with the assistance of ambient listening technology. Verbal consent was obtained by the patient and accompanying visitor(s) for the recording of patient appointment to facilitate this note. I attest to having reviewed and edited the generated note for accuracy, though some syntax or spelling errors may persist. Please  contact the author of this note for any clarification.

## 2024-10-14 ENCOUNTER — PATIENT MESSAGE (OUTPATIENT)
Dept: GASTROENTEROLOGY | Facility: CLINIC | Age: 64
End: 2024-10-14
Payer: COMMERCIAL

## 2024-10-14 RX ORDER — SOD SULF/POT CHLORIDE/MAG SULF 1.479 G
12 TABLET ORAL DAILY
Qty: 24 TABLET | Refills: 0 | Status: SHIPPED | OUTPATIENT
Start: 2024-10-14

## 2024-10-14 RX ORDER — SODIUM, POTASSIUM,MAG SULFATES 17.5-3.13G
1 SOLUTION, RECONSTITUTED, ORAL ORAL DAILY
Qty: 1 KIT | Refills: 0 | Status: CANCELLED | OUTPATIENT
Start: 2024-10-14 | End: 2024-10-16

## 2024-11-20 ENCOUNTER — OFFICE VISIT (OUTPATIENT)
Dept: DERMATOLOGY | Facility: CLINIC | Age: 64
End: 2024-11-20
Payer: COMMERCIAL

## 2024-11-20 DIAGNOSIS — L82.1 SEBORRHEIC KERATOSIS: ICD-10-CM

## 2024-11-20 DIAGNOSIS — L57.0 AK (ACTINIC KERATOSIS): ICD-10-CM

## 2024-11-20 DIAGNOSIS — B07.9 VERRUCA VULGARIS: ICD-10-CM

## 2024-11-20 DIAGNOSIS — L82.0 SEBORRHEIC KERATOSES, INFLAMED: Primary | ICD-10-CM

## 2024-11-20 PROCEDURE — 17110 DESTRUCTION B9 LES UP TO 14: CPT | Mod: S$GLB,,, | Performed by: STUDENT IN AN ORGANIZED HEALTH CARE EDUCATION/TRAINING PROGRAM

## 2024-11-20 PROCEDURE — 99212 OFFICE O/P EST SF 10 MIN: CPT | Mod: 25,S$GLB,, | Performed by: STUDENT IN AN ORGANIZED HEALTH CARE EDUCATION/TRAINING PROGRAM

## 2024-11-20 PROCEDURE — 1160F RVW MEDS BY RX/DR IN RCRD: CPT | Mod: CPTII,S$GLB,, | Performed by: STUDENT IN AN ORGANIZED HEALTH CARE EDUCATION/TRAINING PROGRAM

## 2024-11-20 PROCEDURE — 1159F MED LIST DOCD IN RCRD: CPT | Mod: CPTII,S$GLB,, | Performed by: STUDENT IN AN ORGANIZED HEALTH CARE EDUCATION/TRAINING PROGRAM

## 2024-11-20 PROCEDURE — 3044F HG A1C LEVEL LT 7.0%: CPT | Mod: CPTII,S$GLB,, | Performed by: STUDENT IN AN ORGANIZED HEALTH CARE EDUCATION/TRAINING PROGRAM

## 2024-11-20 NOTE — PATIENT INSTRUCTIONS

## 2025-01-01 DIAGNOSIS — K21.9 GASTROESOPHAGEAL REFLUX DISEASE, UNSPECIFIED WHETHER ESOPHAGITIS PRESENT: ICD-10-CM

## 2025-01-01 NOTE — TELEPHONE ENCOUNTER
No care due was identified.  Health [facility] Embedded Care Due Messages. Reference number: [id number].   1/01/2025 3:40:14 AM CST

## 2025-01-02 NOTE — TELEPHONE ENCOUNTER
Refill Routing Note   Medication(s) are not appropriate for processing by [facility] for the following reason(s):        New or recently adjusted medication    [facility] action(s):  Defer               Appointments  past 12m or future 3m with PCP    Date Provider   Last Visit   10/4/2024 Hugo Franco MD   Next Visit   Visit date not found Hugo Franco MD   ED visits in past 90 days: 0        Note composed:10:10 AM 01/02/2025

## 2025-01-06 RX ORDER — PANTOPRAZOLE SODIUM 40 MG/1
40 TABLET, DELAYED RELEASE ORAL
Qty: 90 TABLET | Refills: 3 | Status: SHIPPED | OUTPATIENT
Start: 2025-01-06

## 2025-02-06 NOTE — TELEPHONE ENCOUNTER
----- Message from Janeth Ford sent at 5/7/2020 11:42 AM CDT -----  Contact: Kristyn(daughter) [phone]  Kristyn is requesting call regarding pt's appt and status. Please advise.   Medical Necessity Information: It is in your best interest to select a reason for this procedure from the list below. All of these items fulfill various CMS LCD requirements except the new and changing color options. Show Aperture Variable?: Yes Post-Care Instructions: I reviewed with the patient in detail post-care instructions. Patient is to wear sunprotection, and avoid picking at any of the treated lesions. Pt may apply Vaseline to crusted or scabbing areas. Spray Paint Technique: No Spray Paint Text: The liquid nitrogen was applied to the skin utilizing a spray paint frosting technique. Number Of Freeze-Thaw Cycles: 3 freeze-thaw cycles Medical Necessity Clause: This procedure was medically necessary because the lesions that were treated were: Consent: The patient's consent was obtained including but not limited to risks of crusting, scabbing, blistering, scarring, darker or lighter pigmentary change, recurrence, incomplete removal and infection. Detail Level: Simple ambulatory

## 2025-03-13 DIAGNOSIS — E78.2 MIXED HYPERLIPIDEMIA: ICD-10-CM

## 2025-03-16 RX ORDER — ROSUVASTATIN CALCIUM 10 MG/1
10 TABLET, COATED ORAL DAILY
Qty: 90 TABLET | Refills: 3 | Status: SHIPPED | OUTPATIENT
Start: 2025-03-16

## 2025-06-18 ENCOUNTER — OFFICE VISIT (OUTPATIENT)
Dept: URGENT CARE | Facility: CLINIC | Age: 65
End: 2025-06-18
Payer: COMMERCIAL

## 2025-06-18 VITALS
SYSTOLIC BLOOD PRESSURE: 115 MMHG | HEART RATE: 54 BPM | WEIGHT: 152 LBS | RESPIRATION RATE: 20 BRPM | HEIGHT: 67 IN | OXYGEN SATURATION: 99 % | TEMPERATURE: 98 F | BODY MASS INDEX: 23.86 KG/M2 | DIASTOLIC BLOOD PRESSURE: 71 MMHG

## 2025-06-18 DIAGNOSIS — M54.42 ACUTE LEFT-SIDED LOW BACK PAIN WITH LEFT-SIDED SCIATICA: Primary | ICD-10-CM

## 2025-06-18 PROCEDURE — 96372 THER/PROPH/DIAG INJ SC/IM: CPT | Mod: S$GLB,,, | Performed by: NURSE PRACTITIONER

## 2025-06-18 PROCEDURE — 99204 OFFICE O/P NEW MOD 45 MIN: CPT | Mod: 25,S$GLB,, | Performed by: NURSE PRACTITIONER

## 2025-06-18 RX ORDER — TIZANIDINE 4 MG/1
TABLET ORAL
Qty: 20 TABLET | Refills: 0 | Status: SHIPPED | OUTPATIENT
Start: 2025-06-18

## 2025-06-18 RX ORDER — KETOROLAC TROMETHAMINE 30 MG/ML
15 INJECTION, SOLUTION INTRAMUSCULAR; INTRAVENOUS ONCE
Status: DISCONTINUED | OUTPATIENT
Start: 2025-06-18 | End: 2025-06-18

## 2025-06-18 RX ORDER — KETOROLAC TROMETHAMINE 30 MG/ML
15 INJECTION, SOLUTION INTRAMUSCULAR; INTRAVENOUS
Status: COMPLETED | OUTPATIENT
Start: 2025-06-18 | End: 2025-06-18

## 2025-06-18 RX ORDER — DEXAMETHASONE SODIUM PHOSPHATE 4 MG/ML
4 INJECTION, SOLUTION INTRA-ARTICULAR; INTRALESIONAL; INTRAMUSCULAR; INTRAVENOUS; SOFT TISSUE ONCE
Status: COMPLETED | OUTPATIENT
Start: 2025-06-18 | End: 2025-06-18

## 2025-06-18 RX ORDER — PREDNISONE 20 MG/1
20 TABLET ORAL DAILY
Qty: 5 TABLET | Refills: 0 | Status: SHIPPED | OUTPATIENT
Start: 2025-06-18 | End: 2025-06-23

## 2025-06-18 RX ADMIN — DEXAMETHASONE SODIUM PHOSPHATE 4 MG: 4 INJECTION, SOLUTION INTRA-ARTICULAR; INTRALESIONAL; INTRAMUSCULAR; INTRAVENOUS; SOFT TISSUE at 09:06

## 2025-06-18 RX ADMIN — KETOROLAC TROMETHAMINE 15 MG: 30 INJECTION, SOLUTION INTRAMUSCULAR; INTRAVENOUS at 09:06

## 2025-06-18 NOTE — PROGRESS NOTES
"Subjective:      Patient ID: Alexia Wolfe is a 64 y.o. female.    Vitals:  height is 5' 7" (1.702 m) and weight is 68.9 kg (152 lb). Her oral temperature is 98 °F (36.7 °C). Her blood pressure is 115/71 and her pulse is 54 (abnormal). Her respiration is 20 and oxygen saturation is 99%.     Chief Complaint: Back Pain    65 y/o female states she bent down to get a show and she feels she pulled a muscle coming up. She took an OTC Aleve without any improvement. She had previous back surgery 30 years ago where she had a herniated disc removed. She reports this happens to her every few years and improves with a shot and muscle relaxer. Denies weakness or trouble controlling her bowels or bladder. Does report mild numbness down her left lateral thigh area.     Back Pain  This is a new problem. The current episode started yesterday. The problem has been gradually worsening since onset. The quality of the pain is described as shooting. The symptoms are aggravated by sitting and standing. Associated symptoms include numbness. Pertinent negatives include no dysuria, fever or weakness.       Constitution: Negative for chills, sweating, fatigue and fever.   HENT: Negative.     Neck: neck negative.   Eyes: Negative.    Respiratory: Negative.     Gastrointestinal: Negative.    Endocrine: negative.   Genitourinary:  Negative for dysuria, frequency, urgency and flank pain.   Musculoskeletal:  Positive for back pain, muscle ache and history of spine disorder.   Skin: Negative.    Allergic/Immunologic: Negative.    Neurological:  Positive for numbness. Negative for dizziness, coordination disturbances, loss of consciousness and tingling.   Hematologic/Lymphatic: Negative.    Psychiatric/Behavioral: Negative.        Objective:     Physical Exam   Constitutional: She is oriented to person, place, and time. She appears well-developed. She is cooperative. No distress.   HENT:   Head: Normocephalic and atraumatic.   Nose: Nose " normal.   Mouth/Throat: Oropharynx is clear and moist and mucous membranes are normal.   Eyes: Conjunctivae and lids are normal.   Neck: Trachea normal and phonation normal. Neck supple.   Cardiovascular: Normal rate, regular rhythm, normal heart sounds and normal pulses.   Pulmonary/Chest: Effort normal and breath sounds normal.   Abdominal: Normal appearance and bowel sounds are normal. She exhibits no mass. Soft.   Musculoskeletal:         General: No deformity.        Legs:       Comments: Tenderness and muscle spasm  Limited ROM with flexion, extension, and lateral bending. Pt could not do straight leg raises   Neurological: She is alert and oriented to person, place, and time. She has normal strength and normal reflexes. No sensory deficit.   Skin: Skin is warm, dry, intact and not diaphoretic.   Psychiatric: Her speech is normal and behavior is normal. Judgment and thought content normal.   Nursing note and vitals reviewed.    Assessment:     1. Acute left-sided low back pain with left-sided sciatica        Plan:       Acute left-sided low back pain with left-sided sciatica  -     dexAMETHasone injection 4 mg  -     ketorolac injection 15 mg  -     predniSONE (DELTASONE) 20 MG tablet; Take 1 tablet (20 mg total) by mouth once daily. for 5 days  Dispense: 5 tablet; Refill: 0  -     tiZANidine (ZANAFLEX) 4 MG tablet; 1/2 to 1 tablet every 6 hours as needed for muscle back pain  Dispense: 20 tablet; Refill: 0           Follow up with your back specialist if you do not feel better in 1 week    Go to the ER if you have worse pain, leg weakness, or trouble controlling your bowels or bladder

## 2025-06-18 NOTE — ADDENDUM NOTE
Addended by: JOSE MIGUEL ANDRADE on: 6/18/2025 09:27 AM     Modules accepted: Orders    
Statement Selected

## (undated) DEVICE — IRRIGATOR ENDOSCOPY DISP.

## (undated) DEVICE — MANIFOLD 4 PORT

## (undated) DEVICE — CORD MONOPOLAR ENERGY INST XI

## (undated) DEVICE — SUT MCRYL PLUS 4-0 PS2 27IN

## (undated) DEVICE — APPLIER MEDIUM LARGE CLIP

## (undated) DEVICE — CORD BIPOLAR ENERGY INST XI

## (undated) DEVICE — SCISSOR HOT SHEARS XI

## (undated) DEVICE — CLIP HEMO-LOK ML

## (undated) DEVICE — OBTURATOR BLADELESS 8MM XI CLR

## (undated) DEVICE — SUT PROLENE 0 CT-2 BL MONO

## (undated) DEVICE — SEE MEDLINE ITEM 156952

## (undated) DEVICE — FORCE BIPOLAR

## (undated) DEVICE — TROCAR ENDOPATH XCEL 5MM 7.5CM

## (undated) DEVICE — SOL IRR WATER STRL 3000 ML

## (undated) DEVICE — SET TRI-LUMEN FILTERED TUBE

## (undated) DEVICE — DRAPE ARM DAVINCI XI

## (undated) DEVICE — KIT WING PAD POSITIONING

## (undated) DEVICE — FORCEP PROGRASP XI

## (undated) DEVICE — ADHESIVE DERMABOND ADVANCED

## (undated) DEVICE — NDL INSUF ULTRA VERESS 120MM

## (undated) DEVICE — SYS CLSR WND ENDOSCP XL

## (undated) DEVICE — TROCAR ENDOPATH XCEL 5X75MM

## (undated) DEVICE — GLOVE SURGICAL LATEX SZ 7

## (undated) DEVICE — HOOK PERM MONOPOLAR CAUTERY

## (undated) DEVICE — SYR 30CC LUER LOCK

## (undated) DEVICE — ELECTRODE REM PLYHSV RETURN 9

## (undated) DEVICE — TIP SUCTION IRRIGATOR

## (undated) DEVICE — SEAL UNIVERSAL 5MM-8MM XI

## (undated) DEVICE — COVER TIP CURVED SCISSORS XI

## (undated) DEVICE — Device

## (undated) DEVICE — NDL HYPO REG 25G X 1 1/2

## (undated) DEVICE — BAG TISSUE RETRIEVAL 5MM